# Patient Record
Sex: FEMALE | Race: WHITE | NOT HISPANIC OR LATINO | Employment: OTHER | URBAN - NONMETROPOLITAN AREA
[De-identification: names, ages, dates, MRNs, and addresses within clinical notes are randomized per-mention and may not be internally consistent; named-entity substitution may affect disease eponyms.]

---

## 2023-02-03 PROBLEM — R73.01 IFG (IMPAIRED FASTING GLUCOSE): Status: ACTIVE | Noted: 2023-02-03

## 2023-02-03 PROBLEM — R51.9 HEADACHE: Status: ACTIVE | Noted: 2023-02-03

## 2023-02-03 PROBLEM — E55.9 VITAMIN D DEFICIENCY, UNSPECIFIED: Status: ACTIVE | Noted: 2023-02-03

## 2023-02-03 PROBLEM — I10 HTN (HYPERTENSION): Status: ACTIVE | Noted: 2023-02-03

## 2023-02-03 PROBLEM — J45.909 ASTHMA (HHS-HCC): Status: ACTIVE | Noted: 2023-02-03

## 2023-02-03 PROBLEM — M85.80 OSTEOPENIA: Status: ACTIVE | Noted: 2023-02-03

## 2023-02-03 PROBLEM — J30.9 ALLERGIC RHINITIS: Status: ACTIVE | Noted: 2023-02-03

## 2023-02-03 PROBLEM — E66.3 OVERWEIGHT (BMI 25.0-29.9): Status: ACTIVE | Noted: 2023-02-03

## 2023-02-03 PROBLEM — M85.851 OSTEOPENIA OF FEMORAL NECK, BILATERAL: Status: ACTIVE | Noted: 2023-02-03

## 2023-02-03 PROBLEM — M85.852 OSTEOPENIA OF FEMORAL NECK, BILATERAL: Status: ACTIVE | Noted: 2023-02-03

## 2023-02-03 PROBLEM — N81.10 FEMALE CYSTOCELE: Status: ACTIVE | Noted: 2023-02-03

## 2023-02-03 RX ORDER — BISOPROLOL FUMARATE AND HYDROCHLOROTHIAZIDE 5; 6.25 MG/1; MG/1
1 TABLET ORAL DAILY
COMMUNITY
End: 2023-03-17 | Stop reason: SDUPTHER

## 2023-02-03 RX ORDER — SUMATRIPTAN 50 MG/1
50 TABLET, FILM COATED ORAL
COMMUNITY
End: 2023-03-17 | Stop reason: SDUPTHER

## 2023-02-03 RX ORDER — NAPROXEN SODIUM 220 MG
TABLET ORAL
COMMUNITY

## 2023-02-03 RX ORDER — LORATADINE 10 MG/1
TABLET ORAL
COMMUNITY

## 2023-03-17 ENCOUNTER — OFFICE VISIT (OUTPATIENT)
Dept: PRIMARY CARE | Facility: CLINIC | Age: 73
End: 2023-03-17
Payer: MEDICARE

## 2023-03-17 VITALS
BODY MASS INDEX: 29.02 KG/M2 | SYSTOLIC BLOOD PRESSURE: 118 MMHG | HEIGHT: 64 IN | HEART RATE: 68 BPM | WEIGHT: 170 LBS | DIASTOLIC BLOOD PRESSURE: 70 MMHG

## 2023-03-17 DIAGNOSIS — M85.89 OSTEOPENIA OF MULTIPLE SITES: Primary | ICD-10-CM

## 2023-03-17 DIAGNOSIS — G89.29 CHRONIC NONINTRACTABLE HEADACHE, UNSPECIFIED HEADACHE TYPE: ICD-10-CM

## 2023-03-17 DIAGNOSIS — R51.9 CHRONIC NONINTRACTABLE HEADACHE, UNSPECIFIED HEADACHE TYPE: ICD-10-CM

## 2023-03-17 DIAGNOSIS — E55.9 VITAMIN D DEFICIENCY, UNSPECIFIED: ICD-10-CM

## 2023-03-17 DIAGNOSIS — Z12.31 ENCOUNTER FOR SCREENING MAMMOGRAM FOR MALIGNANT NEOPLASM OF BREAST: ICD-10-CM

## 2023-03-17 DIAGNOSIS — E66.3 OVERWEIGHT (BMI 25.0-29.9): ICD-10-CM

## 2023-03-17 DIAGNOSIS — I10 PRIMARY HYPERTENSION: ICD-10-CM

## 2023-03-17 PROCEDURE — 99214 OFFICE O/P EST MOD 30 MIN: CPT | Performed by: NURSE PRACTITIONER

## 2023-03-17 PROCEDURE — 1036F TOBACCO NON-USER: CPT | Performed by: NURSE PRACTITIONER

## 2023-03-17 PROCEDURE — 3074F SYST BP LT 130 MM HG: CPT | Performed by: NURSE PRACTITIONER

## 2023-03-17 PROCEDURE — 1159F MED LIST DOCD IN RCRD: CPT | Performed by: NURSE PRACTITIONER

## 2023-03-17 PROCEDURE — 1160F RVW MEDS BY RX/DR IN RCRD: CPT | Performed by: NURSE PRACTITIONER

## 2023-03-17 PROCEDURE — 3078F DIAST BP <80 MM HG: CPT | Performed by: NURSE PRACTITIONER

## 2023-03-17 RX ORDER — SUMATRIPTAN 50 MG/1
50 TABLET, FILM COATED ORAL ONCE AS NEEDED
Qty: 27 TABLET | Refills: 3 | Status: SHIPPED | OUTPATIENT
Start: 2023-03-17 | End: 2023-04-19 | Stop reason: SDUPTHER

## 2023-03-17 RX ORDER — SUMATRIPTAN 50 MG/1
50 TABLET, FILM COATED ORAL ONCE AS NEEDED
Qty: 27 TABLET | Refills: 3 | Status: SHIPPED
Start: 2023-03-17 | End: 2023-03-17

## 2023-03-17 RX ORDER — BISOPROLOL FUMARATE AND HYDROCHLOROTHIAZIDE 5; 6.25 MG/1; MG/1
1 TABLET ORAL DAILY
Qty: 90 TABLET | Refills: 3 | Status: SHIPPED | OUTPATIENT
Start: 2023-03-17 | End: 2023-03-27 | Stop reason: SDUPTHER

## 2023-03-17 RX ORDER — BISOPROLOL FUMARATE AND HYDROCHLOROTHIAZIDE 5; 6.25 MG/1; MG/1
1 TABLET ORAL DAILY
Qty: 90 TABLET | Refills: 3 | Status: SHIPPED
Start: 2023-03-17 | End: 2023-03-17

## 2023-03-17 ASSESSMENT — ENCOUNTER SYMPTOMS
WHEEZING: 0
BLOOD IN STOOL: 0
ABDOMINAL PAIN: 0
HYPERTENSION: 1
SHORTNESS OF BREATH: 0
NAUSEA: 0
LIGHT-HEADEDNESS: 0
CONSTIPATION: 0
DIARRHEA: 0
CHILLS: 0
APPETITE CHANGE: 0
FEVER: 0
JOINT SWELLING: 0
MYALGIAS: 0
VOMITING: 0
COLOR CHANGE: 0
ARTHRALGIAS: 0
DIFFICULTY URINATING: 0
HEMATURIA: 0
ACTIVITY CHANGE: 0
HEADACHES: 1
DIZZINESS: 0
DYSURIA: 0

## 2023-03-17 NOTE — PROGRESS NOTES
"Subjective   Patient ID: Judy Mcgee is a 73 y.o. female who presents for No chief complaint on file..    HPI     Review of Systems    Objective   /70 (Patient Position: Sitting)   Pulse 68   Ht 1.626 m (5' 4\")   Wt 77.1 kg (170 lb)   BMI 29.18 kg/m²     Physical Exam    Assessment/Plan   {Assess/PlanSmartLinks:77213}       "

## 2023-03-17 NOTE — PROGRESS NOTES
Subjective   Patient ID: Judy Mcgee is a 73 y.o. female who presents for Annual Exam.    Well Adult Physical   Patient here for a comprehensive physical exam.The patient reports problems - urinary incontinence    Do you take any herbs or supplements that were not prescribed by a doctor? no   Are you taking calcium supplements? yes   Are you taking aspirin daily? no     History:  LMP: No LMP recorded.  Menopause at 13 years  Last pap date:   Abnormal pap? yes  : 2  Para: 2  Has had Cologaurd in , negative  No previous colonoscopy, declines colonoscopy at this time   Last mammogram ; normal        Hypertension  This is a chronic problem. The current episode started more than 1 year ago. The problem is unchanged. The problem is controlled. Associated symptoms include headaches (migarine average 3/month, summatriptan is effective relief). Pertinent negatives include no chest pain, peripheral edema or shortness of breath. There are no associated agents to hypertension. Risk factors for coronary artery disease include stress. Past treatments include beta blockers and diuretics. The current treatment provides mild improvement. There are no compliance problems.  There is no history of kidney disease or CVA. There is no history of chronic renal disease, sleep apnea or a thyroid problem.        Review of Systems   Constitutional:  Negative for activity change, appetite change, chills and fever.   HENT:  Negative for hearing loss.    Eyes:  Negative for visual disturbance.   Respiratory:  Negative for shortness of breath and wheezing.    Cardiovascular:  Negative for chest pain and leg swelling.   Gastrointestinal:  Negative for abdominal pain, blood in stool, constipation, diarrhea, nausea and vomiting.   Genitourinary:  Negative for difficulty urinating, dysuria and hematuria.        Complains of urinary incontinence when bladder is full and with coughing/sneezing   Musculoskeletal:  Negative for  "arthralgias, joint swelling and myalgias.   Skin: Negative.  Negative for color change.   Neurological:  Positive for headaches (migarine average 3/month, summatriptan is effective relief). Negative for dizziness and light-headedness.       Objective   /70 (Patient Position: Sitting)   Pulse 68   Ht 1.626 m (5' 4\")   Wt 77.1 kg (170 lb)   BMI 29.18 kg/m²     Physical Exam  Constitutional:       General: She is not in acute distress.     Appearance: Normal appearance.   HENT:      Head: Normocephalic.   Eyes:      Conjunctiva/sclera: Conjunctivae normal.      Pupils: Pupils are equal, round, and reactive to light.   Cardiovascular:      Rate and Rhythm: Normal rate and regular rhythm.      Pulses: Normal pulses.   Pulmonary:      Effort: Pulmonary effort is normal.      Breath sounds: Normal breath sounds.   Abdominal:      General: Bowel sounds are normal.      Palpations: Abdomen is soft.      Tenderness: There is no abdominal tenderness.   Musculoskeletal:         General: Normal range of motion.      Cervical back: Normal range of motion.   Skin:     General: Skin is warm and dry.   Neurological:      Mental Status: She is alert and oriented to person, place, and time.         Assessment/Plan   Diagnoses and all orders for this visit:  Osteopenia of multiple sites   -Dexa bone scan 2022   - Continue on calcium/vitamin D supplement     Chronic nonintractable headache, unspecified headache type  -     SUMAtriptan (Imitrex) 50 mg tablet; Take 1 tablet (50 mg) by mouth 1 time if needed for migraine for up to 1 dose. FOR MIGRAINE RELIEF. MAY REPEAT EVERY 2 HOURS. MAX 200MG/DAY.  -     Comprehensive Metabolic Panel; Future  -     CBC; Future  Primary hypertension  -     bisoproloL-hydrochlorothiazide (Ziac) 5-6.25 mg tablet; Take 1 tablet by mouth once daily.  -     Lipid Panel; Future  -     Comprehensive Metabolic Panel; Future  -     CBC; Future  Vitamin D deficiency, unspecified   - Continue on vitamin D " supplement    Overweight (BMI 25.0-29.9)   -Continue to follow heart healthy diet and exercise 3-5 weekly     Encounter for screening mammogram for malignant neoplasm of breast  -     BI mammo bilateral screening tomosynthesis; Future  Other orders  -     Follow Up In Primary Care; Future

## 2023-03-17 NOTE — PATIENT INSTRUCTIONS
Will send a bladder dairy form to my chart, please keep track of urine, episode of incontinence, and liquids taking in  Recommend following a heart healthy diet.  Follow up in 1 year for annual wellness

## 2023-03-27 ENCOUNTER — TELEPHONE (OUTPATIENT)
Dept: PRIMARY CARE | Facility: CLINIC | Age: 73
End: 2023-03-27
Payer: MEDICARE

## 2023-03-27 DIAGNOSIS — I10 PRIMARY HYPERTENSION: ICD-10-CM

## 2023-03-27 RX ORDER — BISOPROLOL FUMARATE AND HYDROCHLOROTHIAZIDE 5; 6.25 MG/1; MG/1
1 TABLET ORAL DAILY
Qty: 90 TABLET | Refills: 3 | Status: SHIPPED
Start: 2023-03-27 | End: 2023-03-29 | Stop reason: SDUPTHER

## 2023-03-29 DIAGNOSIS — I10 PRIMARY HYPERTENSION: ICD-10-CM

## 2023-03-30 DIAGNOSIS — I10 PRIMARY HYPERTENSION: ICD-10-CM

## 2023-03-30 RX ORDER — BISOPROLOL FUMARATE AND HYDROCHLOROTHIAZIDE 5; 6.25 MG/1; MG/1
1 TABLET ORAL DAILY
Qty: 30 TABLET | Refills: 0 | Status: SHIPPED | OUTPATIENT
Start: 2023-03-30 | End: 2023-03-30 | Stop reason: SDUPTHER

## 2023-03-31 RX ORDER — BISOPROLOL FUMARATE AND HYDROCHLOROTHIAZIDE 5; 6.25 MG/1; MG/1
1 TABLET ORAL DAILY
Qty: 90 TABLET | Refills: 3 | Status: SHIPPED | OUTPATIENT
Start: 2023-03-31 | End: 2024-03-18 | Stop reason: SDUPTHER

## 2023-04-19 DIAGNOSIS — R51.9 CHRONIC NONINTRACTABLE HEADACHE, UNSPECIFIED HEADACHE TYPE: ICD-10-CM

## 2023-04-19 DIAGNOSIS — G89.29 CHRONIC NONINTRACTABLE HEADACHE, UNSPECIFIED HEADACHE TYPE: ICD-10-CM

## 2023-04-19 RX ORDER — SUMATRIPTAN 50 MG/1
50 TABLET, FILM COATED ORAL ONCE AS NEEDED
Qty: 10 TABLET | Refills: 3 | Status: SHIPPED | OUTPATIENT
Start: 2023-04-19 | End: 2023-05-05 | Stop reason: SDUPTHER

## 2023-04-25 ENCOUNTER — LAB (OUTPATIENT)
Dept: LAB | Facility: LAB | Age: 73
End: 2023-04-25
Payer: MEDICARE

## 2023-04-25 DIAGNOSIS — G89.29 CHRONIC NONINTRACTABLE HEADACHE, UNSPECIFIED HEADACHE TYPE: ICD-10-CM

## 2023-04-25 DIAGNOSIS — I10 PRIMARY HYPERTENSION: ICD-10-CM

## 2023-04-25 DIAGNOSIS — R51.9 CHRONIC NONINTRACTABLE HEADACHE, UNSPECIFIED HEADACHE TYPE: ICD-10-CM

## 2023-04-25 LAB
ALANINE AMINOTRANSFERASE (SGPT) (U/L) IN SER/PLAS: 50 U/L (ref 7–45)
ALBUMIN (G/DL) IN SER/PLAS: 4.4 G/DL (ref 3.4–5)
ALKALINE PHOSPHATASE (U/L) IN SER/PLAS: 53 U/L (ref 33–136)
ANION GAP IN SER/PLAS: 11 MMOL/L (ref 10–20)
ASPARTATE AMINOTRANSFERASE (SGOT) (U/L) IN SER/PLAS: 30 U/L (ref 9–39)
BILIRUBIN TOTAL (MG/DL) IN SER/PLAS: 0.6 MG/DL (ref 0–1.2)
CALCIUM (MG/DL) IN SER/PLAS: 9.2 MG/DL (ref 8.6–10.3)
CARBON DIOXIDE, TOTAL (MMOL/L) IN SER/PLAS: 29 MMOL/L (ref 21–32)
CHLORIDE (MMOL/L) IN SER/PLAS: 104 MMOL/L (ref 98–107)
CHOLESTEROL (MG/DL) IN SER/PLAS: 170 MG/DL (ref 0–199)
CHOLESTEROL IN HDL (MG/DL) IN SER/PLAS: 47 MG/DL
CHOLESTEROL/HDL RATIO: 3.6
CREATININE (MG/DL) IN SER/PLAS: 0.76 MG/DL (ref 0.5–1.05)
ERYTHROCYTE DISTRIBUTION WIDTH (RATIO) BY AUTOMATED COUNT: 13.8 % (ref 11.5–14.5)
ERYTHROCYTE MEAN CORPUSCULAR HEMOGLOBIN CONCENTRATION (G/DL) BY AUTOMATED: 32 G/DL (ref 32–36)
ERYTHROCYTE MEAN CORPUSCULAR VOLUME (FL) BY AUTOMATED COUNT: 96 FL (ref 80–100)
ERYTHROCYTES (10*6/UL) IN BLOOD BY AUTOMATED COUNT: 4.54 X10E12/L (ref 4–5.2)
GFR FEMALE: 83 ML/MIN/1.73M2
GLUCOSE (MG/DL) IN SER/PLAS: 98 MG/DL (ref 74–99)
HEMATOCRIT (%) IN BLOOD BY AUTOMATED COUNT: 43.4 % (ref 36–46)
HEMOGLOBIN (G/DL) IN BLOOD: 13.9 G/DL (ref 12–16)
LDL: 102 MG/DL (ref 0–99)
LEUKOCYTES (10*3/UL) IN BLOOD BY AUTOMATED COUNT: 8.5 X10E9/L (ref 4.4–11.3)
PLATELETS (10*3/UL) IN BLOOD AUTOMATED COUNT: 384 X10E9/L (ref 150–450)
POTASSIUM (MMOL/L) IN SER/PLAS: 3.8 MMOL/L (ref 3.5–5.3)
PROTEIN TOTAL: 7.3 G/DL (ref 6.4–8.2)
SODIUM (MMOL/L) IN SER/PLAS: 140 MMOL/L (ref 136–145)
TRIGLYCERIDE (MG/DL) IN SER/PLAS: 107 MG/DL (ref 0–149)
UREA NITROGEN (MG/DL) IN SER/PLAS: 26 MG/DL (ref 6–23)
VLDL: 21 MG/DL (ref 0–40)

## 2023-04-25 PROCEDURE — 80053 COMPREHEN METABOLIC PANEL: CPT

## 2023-04-25 PROCEDURE — 85027 COMPLETE CBC AUTOMATED: CPT

## 2023-04-25 PROCEDURE — 36415 COLL VENOUS BLD VENIPUNCTURE: CPT

## 2023-04-25 PROCEDURE — 80061 LIPID PANEL: CPT

## 2023-05-05 DIAGNOSIS — G89.29 CHRONIC NONINTRACTABLE HEADACHE, UNSPECIFIED HEADACHE TYPE: ICD-10-CM

## 2023-05-05 DIAGNOSIS — R51.9 CHRONIC NONINTRACTABLE HEADACHE, UNSPECIFIED HEADACHE TYPE: ICD-10-CM

## 2023-05-06 RX ORDER — SUMATRIPTAN 50 MG/1
50 TABLET, FILM COATED ORAL ONCE AS NEEDED
Qty: 10 TABLET | Refills: 3 | Status: SHIPPED | OUTPATIENT
Start: 2023-05-06 | End: 2024-03-18 | Stop reason: SDUPTHER

## 2024-01-31 ENCOUNTER — OFFICE VISIT (OUTPATIENT)
Dept: PRIMARY CARE | Facility: CLINIC | Age: 74
End: 2024-01-31
Payer: MEDICARE

## 2024-01-31 VITALS
DIASTOLIC BLOOD PRESSURE: 80 MMHG | HEIGHT: 64 IN | TEMPERATURE: 97 F | SYSTOLIC BLOOD PRESSURE: 128 MMHG | WEIGHT: 169.2 LBS | OXYGEN SATURATION: 96 % | HEART RATE: 77 BPM | BODY MASS INDEX: 28.89 KG/M2

## 2024-01-31 DIAGNOSIS — B34.9 VIRAL SYNDROME: Primary | ICD-10-CM

## 2024-01-31 DIAGNOSIS — R05.1 ACUTE COUGH: ICD-10-CM

## 2024-01-31 PROCEDURE — 99213 OFFICE O/P EST LOW 20 MIN: CPT | Performed by: STUDENT IN AN ORGANIZED HEALTH CARE EDUCATION/TRAINING PROGRAM

## 2024-01-31 PROCEDURE — 3079F DIAST BP 80-89 MM HG: CPT | Performed by: STUDENT IN AN ORGANIZED HEALTH CARE EDUCATION/TRAINING PROGRAM

## 2024-01-31 PROCEDURE — 1159F MED LIST DOCD IN RCRD: CPT | Performed by: STUDENT IN AN ORGANIZED HEALTH CARE EDUCATION/TRAINING PROGRAM

## 2024-01-31 PROCEDURE — 1160F RVW MEDS BY RX/DR IN RCRD: CPT | Performed by: STUDENT IN AN ORGANIZED HEALTH CARE EDUCATION/TRAINING PROGRAM

## 2024-01-31 PROCEDURE — 87636 SARSCOV2 & INF A&B AMP PRB: CPT

## 2024-01-31 PROCEDURE — 3074F SYST BP LT 130 MM HG: CPT | Performed by: STUDENT IN AN ORGANIZED HEALTH CARE EDUCATION/TRAINING PROGRAM

## 2024-01-31 PROCEDURE — 1036F TOBACCO NON-USER: CPT | Performed by: STUDENT IN AN ORGANIZED HEALTH CARE EDUCATION/TRAINING PROGRAM

## 2024-01-31 RX ORDER — BENZONATATE 200 MG/1
200 CAPSULE ORAL 3 TIMES DAILY PRN
Qty: 42 CAPSULE | Refills: 0 | Status: SHIPPED | OUTPATIENT
Start: 2024-01-31 | End: 2024-03-01

## 2024-01-31 NOTE — PROGRESS NOTES
"Subjective   Patient ID: Jduy Mcgee is a 73 y.o. female who presents for Cough (Start Sunday - hasn't been around anyone sick) and Headache.    HPI    Cough since the last 4 days.   Worsened last night. Intermittent cough. Mostly dry cough. When she did bring up phlegm it was clear. Denies being SOB. No fever.   Pneumonia - no hx.   No hx of hospitalization with worsening SOB.   Has tried robitussin which did not help much.   Usually gets \"head cold\" this time of the year. But this feels different.    has been ill as well but mostly sinus symptoms. Has been to active.   Did not conduct home testing for covid.     Review of Systems  ROS negative except discussed above in HPI.    Vitals:    01/31/24 1053   BP: 128/80   Pulse: 77   Temp: 36.1 °C (97 °F)   SpO2: 96%     Objective   Physical Exam  Constitutional:       Appearance: Normal appearance.   Cardiovascular:      Rate and Rhythm: Normal rate and regular rhythm.   Pulmonary:      Effort: Pulmonary effort is normal.      Breath sounds: Normal breath sounds.   Musculoskeletal:      Cervical back: Normal range of motion and neck supple.   Lymphadenopathy:      Cervical: No cervical adenopathy.   Neurological:      Mental Status: She is alert.           Assessment/Plan   Judy was seen today for cough and headache.  Diagnoses and all orders for this visit:  Viral syndrome (Primary)  -     benzonatate (Tessalon) 200 mg capsule; Take 1 capsule (200 mg) by mouth 3 times a day as needed for cough. Do not crush or chew.  -     Sars-CoV-2 and Influenza A/B PCR; Future  Acute cough  -     benzonatate (Tessalon) 200 mg capsule; Take 1 capsule (200 mg) by mouth 3 times a day as needed for cough. Do not crush or chew.  -     Sars-CoV-2 and Influenza A/B PCR; Future      Follow up as needed.         Adrian Amaro MD MPH  "

## 2024-02-01 LAB
FLUAV RNA RESP QL NAA+PROBE: NOT DETECTED
FLUBV RNA RESP QL NAA+PROBE: NOT DETECTED
SARS-COV-2 RNA RESP QL NAA+PROBE: NOT DETECTED

## 2024-03-18 ENCOUNTER — OFFICE VISIT (OUTPATIENT)
Dept: PRIMARY CARE | Facility: CLINIC | Age: 74
End: 2024-03-18
Payer: MEDICARE

## 2024-03-18 ENCOUNTER — LAB (OUTPATIENT)
Dept: LAB | Facility: LAB | Age: 74
End: 2024-03-18
Payer: MEDICARE

## 2024-03-18 VITALS
BODY MASS INDEX: 29.3 KG/M2 | DIASTOLIC BLOOD PRESSURE: 74 MMHG | TEMPERATURE: 96 F | HEART RATE: 60 BPM | OXYGEN SATURATION: 98 % | WEIGHT: 170.7 LBS | SYSTOLIC BLOOD PRESSURE: 130 MMHG

## 2024-03-18 DIAGNOSIS — Z12.11 COLON CANCER SCREENING: ICD-10-CM

## 2024-03-18 DIAGNOSIS — Z00.00 ROUTINE GENERAL MEDICAL EXAMINATION AT HEALTH CARE FACILITY: Primary | ICD-10-CM

## 2024-03-18 DIAGNOSIS — I10 PRIMARY HYPERTENSION: ICD-10-CM

## 2024-03-18 DIAGNOSIS — Z12.31 ENCOUNTER FOR SCREENING MAMMOGRAM FOR BREAST CANCER: ICD-10-CM

## 2024-03-18 DIAGNOSIS — Z78.0 MENOPAUSE: ICD-10-CM

## 2024-03-18 DIAGNOSIS — R51.9 CHRONIC NONINTRACTABLE HEADACHE, UNSPECIFIED HEADACHE TYPE: ICD-10-CM

## 2024-03-18 DIAGNOSIS — G43.009 MIGRAINE WITHOUT AURA AND WITHOUT STATUS MIGRAINOSUS, NOT INTRACTABLE: ICD-10-CM

## 2024-03-18 DIAGNOSIS — G89.29 CHRONIC NONINTRACTABLE HEADACHE, UNSPECIFIED HEADACHE TYPE: ICD-10-CM

## 2024-03-18 LAB
ANION GAP SERPL CALC-SCNC: 10 MMOL/L (ref 10–20)
BUN SERPL-MCNC: 22 MG/DL (ref 6–23)
CALCIUM SERPL-MCNC: 10 MG/DL (ref 8.6–10.3)
CHLORIDE SERPL-SCNC: 104 MMOL/L (ref 98–107)
CHOLEST SERPL-MCNC: 168 MG/DL (ref 0–199)
CHOLESTEROL/HDL RATIO: 3
CO2 SERPL-SCNC: 31 MMOL/L (ref 21–32)
CREAT SERPL-MCNC: 0.61 MG/DL (ref 0.5–1.05)
EGFRCR SERPLBLD CKD-EPI 2021: >90 ML/MIN/1.73M*2
ERYTHROCYTE [DISTWIDTH] IN BLOOD BY AUTOMATED COUNT: 13.7 % (ref 11.5–14.5)
GLUCOSE SERPL-MCNC: 94 MG/DL (ref 74–99)
HCT VFR BLD AUTO: 43.5 % (ref 36–46)
HDLC SERPL-MCNC: 56 MG/DL
HGB BLD-MCNC: 14.2 G/DL (ref 12–16)
LDLC SERPL CALC-MCNC: 90 MG/DL
MCH RBC QN AUTO: 31 PG (ref 26–34)
MCHC RBC AUTO-ENTMCNC: 32.6 G/DL (ref 32–36)
MCV RBC AUTO: 95 FL (ref 80–100)
NON HDL CHOLESTEROL: 112 MG/DL (ref 0–149)
NRBC BLD-RTO: 0 /100 WBCS (ref 0–0)
PLATELET # BLD AUTO: 343 X10*3/UL (ref 150–450)
POTASSIUM SERPL-SCNC: 4 MMOL/L (ref 3.5–5.3)
RBC # BLD AUTO: 4.58 X10*6/UL (ref 4–5.2)
SODIUM SERPL-SCNC: 141 MMOL/L (ref 136–145)
TRIGL SERPL-MCNC: 111 MG/DL (ref 0–149)
VLDL: 22 MG/DL (ref 0–40)
WBC # BLD AUTO: 8.1 X10*3/UL (ref 4.4–11.3)

## 2024-03-18 PROCEDURE — 1036F TOBACCO NON-USER: CPT | Performed by: FAMILY MEDICINE

## 2024-03-18 PROCEDURE — 80048 BASIC METABOLIC PNL TOTAL CA: CPT

## 2024-03-18 PROCEDURE — 36415 COLL VENOUS BLD VENIPUNCTURE: CPT

## 2024-03-18 PROCEDURE — 3078F DIAST BP <80 MM HG: CPT | Performed by: FAMILY MEDICINE

## 2024-03-18 PROCEDURE — G0439 PPPS, SUBSEQ VISIT: HCPCS | Performed by: FAMILY MEDICINE

## 2024-03-18 PROCEDURE — 1124F ACP DISCUSS-NO DSCNMKR DOCD: CPT | Performed by: FAMILY MEDICINE

## 2024-03-18 PROCEDURE — 1170F FXNL STATUS ASSESSED: CPT | Performed by: FAMILY MEDICINE

## 2024-03-18 PROCEDURE — 3075F SYST BP GE 130 - 139MM HG: CPT | Performed by: FAMILY MEDICINE

## 2024-03-18 PROCEDURE — 1160F RVW MEDS BY RX/DR IN RCRD: CPT | Performed by: FAMILY MEDICINE

## 2024-03-18 PROCEDURE — 85027 COMPLETE CBC AUTOMATED: CPT

## 2024-03-18 PROCEDURE — 80061 LIPID PANEL: CPT

## 2024-03-18 PROCEDURE — 1159F MED LIST DOCD IN RCRD: CPT | Performed by: FAMILY MEDICINE

## 2024-03-18 PROCEDURE — 99214 OFFICE O/P EST MOD 30 MIN: CPT | Performed by: FAMILY MEDICINE

## 2024-03-18 RX ORDER — SUMATRIPTAN 50 MG/1
50 TABLET, FILM COATED ORAL ONCE AS NEEDED
Qty: 9 TABLET | Refills: 3 | Status: SHIPPED | OUTPATIENT
Start: 2024-03-18

## 2024-03-18 RX ORDER — BISOPROLOL FUMARATE AND HYDROCHLOROTHIAZIDE 5; 6.25 MG/1; MG/1
1 TABLET ORAL DAILY
Qty: 90 TABLET | Refills: 3 | Status: SHIPPED | OUTPATIENT
Start: 2024-03-18 | End: 2025-03-18

## 2024-03-18 ASSESSMENT — ACTIVITIES OF DAILY LIVING (ADL)
BATHING: INDEPENDENT
DOING_HOUSEWORK: INDEPENDENT
DRESSING: INDEPENDENT
MANAGING_FINANCES: INDEPENDENT
GROCERY_SHOPPING: INDEPENDENT
TAKING_MEDICATION: INDEPENDENT

## 2024-03-18 ASSESSMENT — ENCOUNTER SYMPTOMS
LOSS OF SENSATION IN FEET: 0
OCCASIONAL FEELINGS OF UNSTEADINESS: 0
DEPRESSION: 0

## 2024-03-18 ASSESSMENT — PATIENT HEALTH QUESTIONNAIRE - PHQ9
1. LITTLE INTEREST OR PLEASURE IN DOING THINGS: NOT AT ALL
SUM OF ALL RESPONSES TO PHQ9 QUESTIONS 1 AND 2: 0
2. FEELING DOWN, DEPRESSED OR HOPELESS: NOT AT ALL

## 2024-03-18 NOTE — PROGRESS NOTES
Subjective   Reason for Visit: Judy Mcgee is an 74 y.o. female here for a Medicare Wellness visit.     Past Medical, Surgical, and Family History reviewed and updated in chart.    Reviewed all medications by prescribing practitioner or clinical pharmacist (such as prescriptions, OTCs, herbal therapies and supplements) and documented in the medical record.    HPI  Colon cancer screening: cologuard 4/2020-negative    Neg family history for colon cancer   Last Mammogram: 2023 ordered today     DEXA Scan: 9/22-osteopenia, Discussed weight bearing exercises, Incorporation of Calcium and Vitamin D via diet/supplements  Prevnar Vaccine: 3/17  Pneumovax Vaccine: 9/18  Shingrix Vaccine: Completed 7/19    Never treated for osteoporosis     HTN    No cp palpitations    Good control    Migraine with out aura    2 sumatriptan per month    High LDL      Will get labs today after 2 oz of OJ     Advance directives:. Advance Directives discussed, verbal or written information provided if indicated. Patient has living will. Patient has healthcare POA.     Patient's End of Life Decisions: End of life decisions were reviewed with the patient. I agree to follow the patient's decisions. Concerns with the patient's end of life decisions:   Full Code     Patient Care Team:  Jose Arboleda MD as PCP - General (Family Medicine)     Review of Systems    Objective   Vitals:  /74 (BP Location: Left arm, Patient Position: Sitting)   Pulse 60   Temp 35.6 °C (96 °F)   Wt 77.4 kg (170 lb 11.2 oz)   SpO2 98%   BMI 29.30 kg/m²       Physical Exam  Vitals reviewed.   Constitutional:       Appearance: Normal appearance.   HENT:      Head: Normocephalic and atraumatic.   Eyes:      Conjunctiva/sclera: Conjunctivae normal.   Cardiovascular:      Rate and Rhythm: Normal rate and regular rhythm.   Pulmonary:      Effort: Pulmonary effort is normal.      Breath sounds: Normal breath sounds.   Abdominal:      General: Abdomen is flat. Bowel  sounds are normal.   Musculoskeletal:      Cervical back: Neck supple.   Skin:     General: Skin is warm and dry.   Neurological:      General: No focal deficit present.      Mental Status: She is alert and oriented to person, place, and time.   Psychiatric:         Mood and Affect: Mood normal.         Behavior: Behavior normal.         Thought Content: Thought content normal.         Judgment: Judgment normal.         Assessment/Plan   Problem List Items Addressed This Visit       Headache    Relevant Medications    SUMAtriptan (Imitrex) 50 mg tablet    HTN (hypertension)    Relevant Medications    bisoproloL-hydrochlorothiazide (Ziac) 5-6.25 mg tablet    Other Relevant Orders    Lipid Panel    Basic Metabolic Panel    CBC     Other Visit Diagnoses       Routine general medical examination at health care facility    -  Primary    Menopause        Relevant Orders    XR DEXA bone density    Colon cancer screening        Relevant Orders    Cologuard® colon cancer screening    Encounter for screening mammogram for breast cancer        Relevant Orders    BI mammo bilateral screening tomosynthesis    Migraine without aura and without status migrainosus, not intractable        Relevant Medications    SUMAtriptan (Imitrex) 50 mg tablet

## 2024-04-05 LAB — NONINV COLON CA DNA+OCC BLD SCRN STL QL: POSITIVE

## 2024-04-08 DIAGNOSIS — R19.5 POSITIVE COLORECTAL CANCER SCREENING USING COLOGUARD TEST: Primary | ICD-10-CM

## 2024-04-12 ENCOUNTER — OFFICE VISIT (OUTPATIENT)
Dept: SURGERY | Facility: CLINIC | Age: 74
End: 2024-04-12
Payer: MEDICARE

## 2024-04-12 ENCOUNTER — DOCUMENTATION (OUTPATIENT)
Dept: SURGERY | Facility: CLINIC | Age: 74
End: 2024-04-12

## 2024-04-12 VITALS
WEIGHT: 170 LBS | DIASTOLIC BLOOD PRESSURE: 80 MMHG | HEART RATE: 66 BPM | BODY MASS INDEX: 30.12 KG/M2 | HEIGHT: 63 IN | SYSTOLIC BLOOD PRESSURE: 140 MMHG

## 2024-04-12 DIAGNOSIS — R19.5 POSITIVE COLORECTAL CANCER SCREENING USING COLOGUARD TEST: ICD-10-CM

## 2024-04-12 PROCEDURE — 1036F TOBACCO NON-USER: CPT | Performed by: SURGERY

## 2024-04-12 PROCEDURE — 99203 OFFICE O/P NEW LOW 30 MIN: CPT | Performed by: SURGERY

## 2024-04-12 PROCEDURE — 1159F MED LIST DOCD IN RCRD: CPT | Performed by: SURGERY

## 2024-04-12 PROCEDURE — 1160F RVW MEDS BY RX/DR IN RCRD: CPT | Performed by: SURGERY

## 2024-04-12 PROCEDURE — 3079F DIAST BP 80-89 MM HG: CPT | Performed by: SURGERY

## 2024-04-12 PROCEDURE — 3077F SYST BP >= 140 MM HG: CPT | Performed by: SURGERY

## 2024-04-12 NOTE — PROGRESS NOTES
Notified patient of Colonoscopy  scheduled on 4-25-24   .Instructions reviewed. Advised patient [facility] will contact them 24 hours before surgery with arrival time. Pt voices understanding. Lee Ann Valles MA.

## 2024-04-12 NOTE — PROGRESS NOTES
General Surgery Consultation    Patient: Judy Mcgee  : 1950  MRN: [id number]  Date of Consultation: 24    Primary Care Provider: Jose Arboleda MD  Referring Provider: Jose Arboleda MD    Chief Complaint: Positive Cologuard    History of Present Illness: Judy Mcgee is a 74 y.o. old female seen at the request of Jose Arboleda MD for evaluation of positive Cologuard.  Patient has never had a colonoscopy.  She denies any change in bowel habits unexplained abdominal pain weight loss or blood in the stool.  She states her maternal grandmother had a tumor in her bowel in her 70s but lived to be in her 90s.  Patient is scheduled for mammograms next week.    Medical History:  HTN  Osteoporosis      Surgical History:  Carpal tunnel  Knee replacement  Ostectomy calcaneal spur/Achilles      Home Medications:  Prior to Admission medications    Medication Sig Start Date End Date Taking? Authorizing Provider   bisoproloL-hydrochlorothiazide (Ziac) 5-6.25 mg tablet Take 1 tablet by mouth once daily. 3/18/24 3/18/25 Yes Jose Arboleda MD   CALCIUM CITRATE-VITAMIN D3 ORAL Take by mouth.   Yes Historical Provider, MD   loratadine (Claritin) 10 mg tablet Take by mouth.   Yes Historical Provider, MD   mv,inés,min/iron/folic acid/lut (COMPLETE MULTI ORAL) Take by mouth. Multi Complete Oral Capsule   Yes Historical Provider, MD   naproxen sodium (Aleve) 220 mg tablet Take by mouth.   Yes Historical Provider, MD   SUMAtriptan (Imitrex) 50 mg tablet Take 1 tablet (50 mg) by mouth 1 time if needed for migraine (may repeat dose in 1 hour if needed, DO NOT Eceed 200 mg/day) for up to 10 doses. FOR MIGRAINE RELIEF. MAY REPEAT EVERY 2 HOURS. MAX 200MG/DAY. 3/18/24  Yes Jose Arboleda MD       Allergies:  No Known Allergies  has No Known Allergies.    Family History:   Family History   Problem Relation Name Age of Onset    Osteoporosis Mother      Hypertension Father      Heart attack Father      Other (cardiac  "disorder) Father      Stroke Father      Hypertension Sister      Migraines Sister      Colon cancer Maternal Grandmother      Stroke Other grandparent        Social History:  Patient denies tobacco use and drinks occasional        ROS:  Constitutional:  no fever, sweats, and chills  Cardiovascular: No chest pain  Respiratory: No cough or shortness of breath  Gastrointestinal: Denies any change in bowel habits or blood in her stool  Genitourinary: no dysuria  Musculoskeletal: no weakness or swelling  Integumentary: no rashes  Neurological: no confusion  Endocrine: no heat or cold intolerance  Heme/Lymph: no easy bruising or bleeding    Objective:  /80   Pulse 66   Ht 1.61 m (5' 3.39\")   Wt 77.1 kg (170 lb)   BMI 29.75 kg/m²     Physical Exam:  Constitutional: No acute distress, conversant, pleasant  Neurologic: alert and oriented  Psych: appropriate affect  Ears, Nose, Mouth and Throat: mucus membranes moist  Pulmonary: No labored breathing  Cardiovascular: Regular rate and rhythm  Abdomen: soft, non-distended, non-tender  Musculoskeletal: Moves all extremities, no edema  Skin: warm and dry    Procedure:      Labs:    Imaging:      Assessment and Plan: Judy Mcgee is a 74 y.o. old female with positive Cologuard.  We discussed the indication for colonoscopy as well as the differential diagnosis including anything from no findings all the way up to a colon cancer.  We reviewed the prep as well as the risks and potential complications including but not limited to bleeding, infection, reaction to the anesthetic, stroke MI and/or death.  We discussed the risk of perforation or need for completion barium studies.  All questions were answered and she asked us to proceed..     Chantelle Staples MD  4/12/2024  "

## 2024-04-12 NOTE — H&P (VIEW-ONLY)
General Surgery Consultation    Patient: Judy Mcgee  : 1950  MRN: [id number]  Date of Consultation: 24    Primary Care Provider: Jose Arboleda MD  Referring Provider: Jose Arboleda MD    Chief Complaint: Positive Cologuard    History of Present Illness: Judy Mcgee is a 74 y.o. old female seen at the request of Jose Arboleda MD for evaluation of positive Cologuard.  Patient has never had a colonoscopy.  She denies any change in bowel habits unexplained abdominal pain weight loss or blood in the stool.  She states her maternal grandmother had a tumor in her bowel in her 70s but lived to be in her 90s.  Patient is scheduled for mammograms next week.    Medical History:  HTN  Osteoporosis      Surgical History:  Carpal tunnel  Knee replacement  Ostectomy calcaneal spur/Achilles      Home Medications:  Prior to Admission medications    Medication Sig Start Date End Date Taking? Authorizing Provider   bisoproloL-hydrochlorothiazide (Ziac) 5-6.25 mg tablet Take 1 tablet by mouth once daily. 3/18/24 3/18/25 Yes Jose Arboleda MD   CALCIUM CITRATE-VITAMIN D3 ORAL Take by mouth.   Yes Historical Provider, MD   loratadine (Claritin) 10 mg tablet Take by mouth.   Yes Historical Provider, MD   mv,inés,min/iron/folic acid/lut (COMPLETE MULTI ORAL) Take by mouth. Multi Complete Oral Capsule   Yes Historical Provider, MD   naproxen sodium (Aleve) 220 mg tablet Take by mouth.   Yes Historical Provider, MD   SUMAtriptan (Imitrex) 50 mg tablet Take 1 tablet (50 mg) by mouth 1 time if needed for migraine (may repeat dose in 1 hour if needed, DO NOT Eceed 200 mg/day) for up to 10 doses. FOR MIGRAINE RELIEF. MAY REPEAT EVERY 2 HOURS. MAX 200MG/DAY. 3/18/24  Yes Jose Arboleda MD       Allergies:  No Known Allergies  has No Known Allergies.    Family History:   Family History   Problem Relation Name Age of Onset    Osteoporosis Mother      Hypertension Father      Heart attack Father      Other (cardiac  "disorder) Father      Stroke Father      Hypertension Sister      Migraines Sister      Colon cancer Maternal Grandmother      Stroke Other grandparent        Social History:  Patient denies tobacco use and drinks occasional        ROS:  Constitutional:  no fever, sweats, and chills  Cardiovascular: No chest pain  Respiratory: No cough or shortness of breath  Gastrointestinal: Denies any change in bowel habits or blood in her stool  Genitourinary: no dysuria  Musculoskeletal: no weakness or swelling  Integumentary: no rashes  Neurological: no confusion  Endocrine: no heat or cold intolerance  Heme/Lymph: no easy bruising or bleeding    Objective:  /80   Pulse 66   Ht 1.61 m (5' 3.39\")   Wt 77.1 kg (170 lb)   BMI 29.75 kg/m²     Physical Exam:  Constitutional: No acute distress, conversant, pleasant  Neurologic: alert and oriented  Psych: appropriate affect  Ears, Nose, Mouth and Throat: mucus membranes moist  Pulmonary: No labored breathing  Cardiovascular: Regular rate and rhythm  Abdomen: soft, non-distended, non-tender  Musculoskeletal: Moves all extremities, no edema  Skin: warm and dry    Procedure:      Labs:    Imaging:      Assessment and Plan: Judy Mcgee is a 74 y.o. old female with positive Cologuard.  We discussed the indication for colonoscopy as well as the differential diagnosis including anything from no findings all the way up to a colon cancer.  We reviewed the prep as well as the risks and potential complications including but not limited to bleeding, infection, reaction to the anesthetic, stroke MI and/or death.  We discussed the risk of perforation or need for completion barium studies.  All questions were answered and she asked us to proceed..     Chantelle Staples MD  4/12/2024  "

## 2024-04-17 ENCOUNTER — HOSPITAL ENCOUNTER (OUTPATIENT)
Dept: RADIOLOGY | Facility: CLINIC | Age: 74
End: 2024-04-17
Payer: MEDICARE

## 2024-04-17 ENCOUNTER — APPOINTMENT (OUTPATIENT)
Dept: RADIOLOGY | Facility: CLINIC | Age: 74
End: 2024-04-17
Payer: MEDICARE

## 2024-04-22 ENCOUNTER — HOSPITAL ENCOUNTER (OUTPATIENT)
Dept: RADIOLOGY | Facility: CLINIC | Age: 74
Discharge: HOME | End: 2024-04-22
Payer: MEDICARE

## 2024-04-22 VITALS — BODY MASS INDEX: 30.12 KG/M2 | HEIGHT: 63 IN | WEIGHT: 170 LBS

## 2024-04-22 DIAGNOSIS — Z78.0 MENOPAUSE: ICD-10-CM

## 2024-04-22 DIAGNOSIS — Z12.31 ENCOUNTER FOR SCREENING MAMMOGRAM FOR BREAST CANCER: ICD-10-CM

## 2024-04-22 PROCEDURE — 77063 BREAST TOMOSYNTHESIS BI: CPT | Performed by: RADIOLOGY

## 2024-04-22 PROCEDURE — 77080 DXA BONE DENSITY AXIAL: CPT | Mod: MUE

## 2024-04-22 PROCEDURE — 77067 SCR MAMMO BI INCL CAD: CPT | Performed by: RADIOLOGY

## 2024-04-22 PROCEDURE — 77067 SCR MAMMO BI INCL CAD: CPT | Mod: MUE

## 2024-04-22 PROCEDURE — 77063 BREAST TOMOSYNTHESIS BI: CPT

## 2024-04-22 PROCEDURE — 77080 DXA BONE DENSITY AXIAL: CPT

## 2024-04-23 PROCEDURE — 77063 BREAST TOMOSYNTHESIS BI: CPT

## 2024-04-24 ENCOUNTER — APPOINTMENT (OUTPATIENT)
Dept: RADIOLOGY | Facility: CLINIC | Age: 74
End: 2024-04-24
Payer: MEDICARE

## 2024-04-25 ENCOUNTER — HOSPITAL ENCOUNTER (OUTPATIENT)
Dept: OPERATING ROOM | Facility: HOSPITAL | Age: 74
Setting detail: OUTPATIENT SURGERY
Discharge: HOME | End: 2024-04-25
Payer: MEDICARE

## 2024-04-25 ENCOUNTER — ANESTHESIA (OUTPATIENT)
Dept: OPERATING ROOM | Facility: HOSPITAL | Age: 74
End: 2024-04-25
Payer: MEDICARE

## 2024-04-25 ENCOUNTER — ANESTHESIA EVENT (OUTPATIENT)
Dept: OPERATING ROOM | Facility: HOSPITAL | Age: 74
End: 2024-04-25
Payer: MEDICARE

## 2024-04-25 VITALS
HEIGHT: 63 IN | SYSTOLIC BLOOD PRESSURE: 106 MMHG | RESPIRATION RATE: 16 BRPM | WEIGHT: 164.68 LBS | DIASTOLIC BLOOD PRESSURE: 48 MMHG | TEMPERATURE: 97.8 F | BODY MASS INDEX: 29.18 KG/M2 | OXYGEN SATURATION: 98 % | HEART RATE: 60 BPM

## 2024-04-25 DIAGNOSIS — R19.5 POSITIVE COLORECTAL CANCER SCREENING USING COLOGUARD TEST: Primary | ICD-10-CM

## 2024-04-25 PROCEDURE — 2500000004 HC RX 250 GENERAL PHARMACY W/ HCPCS (ALT 636 FOR OP/ED): Performed by: ANESTHESIOLOGY

## 2024-04-25 PROCEDURE — 3700000001 HC GENERAL ANESTHESIA TIME - INITIAL BASE CHARGE: Performed by: ANESTHESIOLOGY

## 2024-04-25 PROCEDURE — 3600000002 HC OR TIME - INITIAL BASE CHARGE - PROCEDURE LEVEL TWO: Performed by: ANESTHESIOLOGY

## 2024-04-25 PROCEDURE — 7100000009 HC PHASE TWO TIME - INITIAL BASE CHARGE: Performed by: ANESTHESIOLOGY

## 2024-04-25 PROCEDURE — 3600000007 HC OR TIME - EACH INCREMENTAL 1 MINUTE - PROCEDURE LEVEL TWO: Performed by: ANESTHESIOLOGY

## 2024-04-25 PROCEDURE — 7100000010 HC PHASE TWO TIME - EACH INCREMENTAL 1 MINUTE: Performed by: ANESTHESIOLOGY

## 2024-04-25 PROCEDURE — 3700000002 HC GENERAL ANESTHESIA TIME - EACH INCREMENTAL 1 MINUTE: Performed by: ANESTHESIOLOGY

## 2024-04-25 PROCEDURE — 2500000005 HC RX 250 GENERAL PHARMACY W/O HCPCS: Performed by: ANESTHESIOLOGY

## 2024-04-25 PROCEDURE — 2500000004 HC RX 250 GENERAL PHARMACY W/ HCPCS (ALT 636 FOR OP/ED): Mod: JZ | Performed by: SURGERY

## 2024-04-25 PROCEDURE — 45378 DIAGNOSTIC COLONOSCOPY: CPT | Performed by: SURGERY

## 2024-04-25 RX ORDER — LABETALOL HYDROCHLORIDE 5 MG/ML
5 INJECTION, SOLUTION INTRAVENOUS ONCE AS NEEDED
Status: DISCONTINUED | OUTPATIENT
Start: 2024-04-25 | End: 2024-04-26 | Stop reason: HOSPADM

## 2024-04-25 RX ORDER — PROPOFOL 10 MG/ML
INJECTION, EMULSION INTRAVENOUS CONTINUOUS PRN
Status: DISCONTINUED | OUTPATIENT
Start: 2024-04-25 | End: 2024-04-25

## 2024-04-25 RX ORDER — ONDANSETRON HYDROCHLORIDE 2 MG/ML
4 INJECTION, SOLUTION INTRAVENOUS ONCE AS NEEDED
Status: DISCONTINUED | OUTPATIENT
Start: 2024-04-25 | End: 2024-04-26 | Stop reason: HOSPADM

## 2024-04-25 RX ORDER — SODIUM CHLORIDE 9 MG/ML
100 INJECTION, SOLUTION INTRAVENOUS CONTINUOUS
Status: DISCONTINUED | OUTPATIENT
Start: 2024-04-25 | End: 2024-04-26 | Stop reason: HOSPADM

## 2024-04-25 RX ORDER — MORPHINE SULFATE 4 MG/ML
2 INJECTION, SOLUTION INTRAMUSCULAR; INTRAVENOUS EVERY 5 MIN PRN
Status: DISCONTINUED | OUTPATIENT
Start: 2024-04-25 | End: 2024-04-26 | Stop reason: HOSPADM

## 2024-04-25 RX ORDER — PROPOFOL 10 MG/ML
INJECTION, EMULSION INTRAVENOUS AS NEEDED
Status: DISCONTINUED | OUTPATIENT
Start: 2024-04-25 | End: 2024-04-25

## 2024-04-25 RX ORDER — SODIUM CHLORIDE, SODIUM LACTATE, POTASSIUM CHLORIDE, CALCIUM CHLORIDE 600; 310; 30; 20 MG/100ML; MG/100ML; MG/100ML; MG/100ML
50 INJECTION, SOLUTION INTRAVENOUS CONTINUOUS
Status: DISCONTINUED | OUTPATIENT
Start: 2024-04-25 | End: 2024-04-26 | Stop reason: HOSPADM

## 2024-04-25 RX ORDER — OXYCODONE HYDROCHLORIDE 5 MG/1
5 TABLET ORAL EVERY 4 HOURS PRN
Status: DISCONTINUED | OUTPATIENT
Start: 2024-04-25 | End: 2024-04-26 | Stop reason: HOSPADM

## 2024-04-25 RX ORDER — SODIUM CHLORIDE, SODIUM LACTATE, POTASSIUM CHLORIDE, CALCIUM CHLORIDE 600; 310; 30; 20 MG/100ML; MG/100ML; MG/100ML; MG/100ML
100 INJECTION, SOLUTION INTRAVENOUS CONTINUOUS
Status: DISCONTINUED | OUTPATIENT
Start: 2024-04-25 | End: 2024-04-26 | Stop reason: HOSPADM

## 2024-04-25 RX ORDER — MORPHINE SULFATE 4 MG/ML
4 INJECTION, SOLUTION INTRAMUSCULAR; INTRAVENOUS EVERY 5 MIN PRN
Status: DISCONTINUED | OUTPATIENT
Start: 2024-04-25 | End: 2024-04-26 | Stop reason: HOSPADM

## 2024-04-25 RX ADMIN — GLUCAGON HYDROCHLORIDE 1 MG: KIT at 10:29

## 2024-04-25 RX ADMIN — Medication 25 MG: at 10:27

## 2024-04-25 RX ADMIN — SODIUM CHLORIDE, POTASSIUM CHLORIDE, SODIUM LACTATE AND CALCIUM CHLORIDE 100 ML/HR: 600; 310; 30; 20 INJECTION, SOLUTION INTRAVENOUS at 09:25

## 2024-04-25 RX ADMIN — PROPOFOL 80 MCG/KG/MIN: 10 INJECTION, EMULSION INTRAVENOUS at 10:27

## 2024-04-25 ASSESSMENT — COLUMBIA-SUICIDE SEVERITY RATING SCALE - C-SSRS
2. HAVE YOU ACTUALLY HAD ANY THOUGHTS OF KILLING YOURSELF?: NO
1. IN THE PAST MONTH, HAVE YOU WISHED YOU WERE DEAD OR WISHED YOU COULD GO TO SLEEP AND NOT WAKE UP?: NO
6. HAVE YOU EVER DONE ANYTHING, STARTED TO DO ANYTHING, OR PREPARED TO DO ANYTHING TO END YOUR LIFE?: NO

## 2024-04-25 ASSESSMENT — PAIN SCALES - GENERAL
PAINLEVEL_OUTOF10: 0 - NO PAIN

## 2024-04-25 ASSESSMENT — PAIN - FUNCTIONAL ASSESSMENT
PAIN_FUNCTIONAL_ASSESSMENT: 0-10

## 2024-04-25 NOTE — ANESTHESIA POSTPROCEDURE EVALUATION
Patient: Judy Mcgee    Procedure Summary       Date: 04/25/24 Room / Location: [facility] OR    Anesthesia Start: 1024 Anesthesia Stop: 1050    Procedure: COLONOSCOPY Diagnosis: Positive colorectal cancer screening using Cologuard test    Scheduled Providers: Chantelle Staples MD; Chu Perez MD Responsible Provider: Chu Perez MD    Anesthesia Type: MAC ASA Status: 2            Anesthesia Type: MAC    Vitals Value Taken Time   BP 91/45 04/25/24 1048   Temp 36.6 °C (97.8 °F) 04/25/24 1048   Pulse 63 04/25/24 1048   Resp 16 04/25/24 1048   SpO2 96 % 04/25/24 1048       Anesthesia Post Evaluation    Patient location during evaluation: bedside  Patient participation: complete - patient participated  Level of consciousness: sleepy but conscious  Pain management: adequate  Airway patency: patent  Cardiovascular status: acceptable  Respiratory status: acceptable  Hydration status: acceptable  Postoperative Nausea and Vomiting: none    No notable events documented.

## 2024-04-25 NOTE — ANESTHESIA PREPROCEDURE EVALUATION
Patient: Judy Mcgee    Procedure Information       Date/Time: 04/25/24 1020    Scheduled providers: Chantelle Staples MD; Chu Perez MD    Procedure: COLONOSCOPY    Location: [facility] OR            Relevant Problems   Cardiac   (+) HTN (hypertension)      Pulmonary   (+) Asthma ([facility]HS-HCC)       Clinical information reviewed:   Tobacco  Allergies  Meds   Med Hx  Surg Hx  OB Status  Fam Hx          NPO Detail:  NPO/Void Status  Carbohydrate Drink Given Prior to Surgery? : N  Date of Last Liquid: 04/25/24  Time of Last Liquid: 0500  Date of Last Solid: 04/24/24  Time of Last Solid: 0800  Last Intake Type: Clear fluids  Time of Last Void: 0900         Physical Exam    Airway  Mallampati: II  TM distance: >3 FB  Neck ROM: full     Cardiovascular   Rhythm: regular     Dental    Pulmonary    Abdominal            Anesthesia Plan    History of general anesthesia?: yes  History of complications of general anesthesia?: no    ASA 2     MAC     Anesthetic plan and risks discussed with patient.

## 2024-04-26 ENCOUNTER — TELEPHONE (OUTPATIENT)
Dept: SURGERY | Facility: CLINIC | Age: 74
End: 2024-04-26
Payer: MEDICARE

## 2024-04-26 NOTE — TELEPHONE ENCOUNTER
Left message to see how patient was doing after colonscopy. Patient to return call if has any questions or concerns.

## 2024-09-04 ENCOUNTER — APPOINTMENT (OUTPATIENT)
Dept: PRIMARY CARE | Facility: CLINIC | Age: 74
End: 2024-09-04
Payer: MEDICARE

## 2024-09-04 ENCOUNTER — LAB (OUTPATIENT)
Dept: LAB | Facility: LAB | Age: 74
End: 2024-09-04
Payer: MEDICARE

## 2024-09-04 VITALS
SYSTOLIC BLOOD PRESSURE: 130 MMHG | DIASTOLIC BLOOD PRESSURE: 80 MMHG | BODY MASS INDEX: 29.29 KG/M2 | WEIGHT: 165.3 LBS | HEART RATE: 56 BPM | OXYGEN SATURATION: 96 %

## 2024-09-04 DIAGNOSIS — H26.9 CATARACT OF BOTH EYES, UNSPECIFIED CATARACT TYPE: ICD-10-CM

## 2024-09-04 DIAGNOSIS — I10 PRIMARY HYPERTENSION: ICD-10-CM

## 2024-09-04 DIAGNOSIS — Z01.818 PREOP EXAMINATION: Primary | ICD-10-CM

## 2024-09-04 LAB
ANION GAP SERPL CALC-SCNC: 13 MMOL/L (ref 10–20)
BUN SERPL-MCNC: 21 MG/DL (ref 6–23)
CALCIUM SERPL-MCNC: 9.6 MG/DL (ref 8.6–10.3)
CHLORIDE SERPL-SCNC: 102 MMOL/L (ref 98–107)
CO2 SERPL-SCNC: 28 MMOL/L (ref 21–32)
CREAT SERPL-MCNC: 0.73 MG/DL (ref 0.5–1.05)
EGFRCR SERPLBLD CKD-EPI 2021: 87 ML/MIN/1.73M*2
GLUCOSE SERPL-MCNC: 109 MG/DL (ref 74–99)
POTASSIUM SERPL-SCNC: 4.3 MMOL/L (ref 3.5–5.3)
SODIUM SERPL-SCNC: 139 MMOL/L (ref 136–145)

## 2024-09-04 PROCEDURE — 36415 COLL VENOUS BLD VENIPUNCTURE: CPT

## 2024-09-04 PROCEDURE — 3079F DIAST BP 80-89 MM HG: CPT | Performed by: FAMILY MEDICINE

## 2024-09-04 PROCEDURE — 93000 ELECTROCARDIOGRAM COMPLETE: CPT | Performed by: FAMILY MEDICINE

## 2024-09-04 PROCEDURE — 1036F TOBACCO NON-USER: CPT | Performed by: FAMILY MEDICINE

## 2024-09-04 PROCEDURE — 3075F SYST BP GE 130 - 139MM HG: CPT | Performed by: FAMILY MEDICINE

## 2024-09-04 PROCEDURE — 99214 OFFICE O/P EST MOD 30 MIN: CPT | Performed by: FAMILY MEDICINE

## 2024-09-04 PROCEDURE — 1159F MED LIST DOCD IN RCRD: CPT | Performed by: FAMILY MEDICINE

## 2024-09-04 PROCEDURE — 80048 BASIC METABOLIC PNL TOTAL CA: CPT

## 2024-09-04 PROCEDURE — 1160F RVW MEDS BY RX/DR IN RCRD: CPT | Performed by: FAMILY MEDICINE

## 2024-09-04 NOTE — PROGRESS NOTES
Subjective   Patient ID: Judy Mcgee is a 74 y.o. female who presents for Pre-op Exam (9/26/24 Dr Lee; cataracs).    HPI   Cataract removal per Dr Lee     Revised cardiac risk index is =0   Exercise walking  30 mintues daily      With no associated chest pain   H/o bmp  normal 6 months ago     Migraine last needed imitrex 2 to 3 months   Last aleve was last weekly     Htn good control     Tkr 2019 last EKG           Review of Systems    Objective   /80   Pulse 56   Wt 75 kg (165 lb 4.8 oz)   SpO2 96%   BMI 29.29 kg/m²     Physical Exam  Vitals reviewed.   Constitutional:       Appearance: Normal appearance.   HENT:      Head: Normocephalic and atraumatic.   Eyes:      Conjunctiva/sclera: Conjunctivae normal.   Cardiovascular:      Rate and Rhythm: Normal rate and regular rhythm.   Pulmonary:      Effort: Pulmonary effort is normal.      Breath sounds: Normal breath sounds.   Musculoskeletal:      Cervical back: Neck supple.      Right lower leg: No edema.      Left lower leg: No edema.   Skin:     General: Skin is warm and dry.   Neurological:      General: No focal deficit present.      Mental Status: She is alert and oriented to person, place, and time.   Psychiatric:         Mood and Affect: Mood normal.         Behavior: Behavior normal.         Thought Content: Thought content normal.         Judgment: Judgment normal.         Assessment/Plan   Diagnoses and all orders for this visit:  Preop examination  -     ECG 12 lead (Clinic Performed)  Cataract of both eyes, unspecified cataract type  Primary hypertension  -     Basic Metabolic Panel; Future    Pt is low risk for sugery      EKG no changes from 2018

## 2024-09-19 ENCOUNTER — PREP FOR PROCEDURE (OUTPATIENT)
Dept: OPERATING ROOM | Facility: HOSPITAL | Age: 74
End: 2024-09-19
Payer: MEDICARE

## 2024-09-19 DIAGNOSIS — H25.811 COMBINED FORMS OF AGE-RELATED CATARACT OF RIGHT EYE: Primary | ICD-10-CM

## 2024-09-19 RX ORDER — PREDNISOLONE ACETATE 10 MG/ML
1 SUSPENSION/ DROPS OPHTHALMIC ONCE
OUTPATIENT
Start: 2024-09-19 | End: 2024-09-19

## 2024-09-19 RX ORDER — POVIDONE-IODINE 5 %
SOLUTION, NON-ORAL OPHTHALMIC (EYE) ONCE
OUTPATIENT
Start: 2024-09-19 | End: 2024-09-19

## 2024-09-19 RX ORDER — KETOROLAC TROMETHAMINE 5 MG/ML
1 SOLUTION OPHTHALMIC
OUTPATIENT
Start: 2024-09-19 | End: 2024-09-19

## 2024-09-19 RX ORDER — SODIUM CHLORIDE, SODIUM LACTATE, POTASSIUM CHLORIDE, CALCIUM CHLORIDE 600; 310; 30; 20 MG/100ML; MG/100ML; MG/100ML; MG/100ML
20 INJECTION, SOLUTION INTRAVENOUS CONTINUOUS
OUTPATIENT
Start: 2024-09-19

## 2024-09-19 RX ORDER — TETRACAINE HYDROCHLORIDE 5 MG/ML
1 SOLUTION OPHTHALMIC ONCE
OUTPATIENT
Start: 2024-09-19 | End: 2024-09-19

## 2024-11-19 ENCOUNTER — OFFICE VISIT (OUTPATIENT)
Dept: PRIMARY CARE | Facility: CLINIC | Age: 74
End: 2024-11-19
Payer: MEDICARE

## 2024-11-19 VITALS
SYSTOLIC BLOOD PRESSURE: 136 MMHG | WEIGHT: 165.8 LBS | TEMPERATURE: 97.7 F | OXYGEN SATURATION: 97 % | HEART RATE: 65 BPM | DIASTOLIC BLOOD PRESSURE: 70 MMHG | BODY MASS INDEX: 29.38 KG/M2

## 2024-11-19 DIAGNOSIS — J40 BRONCHITIS: ICD-10-CM

## 2024-11-19 DIAGNOSIS — R09.81 SINUS CONGESTION: Primary | ICD-10-CM

## 2024-11-19 PROBLEM — Z86.69 HISTORY OF RETINAL TEAR: Status: RESOLVED | Noted: 2024-09-11 | Resolved: 2024-11-19

## 2024-11-19 PROBLEM — H47.233 OPTIC CUPPING OF BOTH EYES: Status: ACTIVE | Noted: 2024-09-11

## 2024-11-19 PROBLEM — H25.812 COMBINED FORMS OF AGE-RELATED CATARACT OF LEFT EYE: Status: ACTIVE | Noted: 2024-09-11

## 2024-11-19 PROBLEM — G43.909 MIGRAINES: Status: ACTIVE | Noted: 2024-11-19

## 2024-11-19 PROBLEM — H40.053 OCULAR HYPERTENSION, BILATERAL: Status: ACTIVE | Noted: 2024-09-11

## 2024-11-19 PROCEDURE — 1160F RVW MEDS BY RX/DR IN RCRD: CPT | Performed by: NURSE PRACTITIONER

## 2024-11-19 PROCEDURE — 3075F SYST BP GE 130 - 139MM HG: CPT | Performed by: NURSE PRACTITIONER

## 2024-11-19 PROCEDURE — 3078F DIAST BP <80 MM HG: CPT | Performed by: NURSE PRACTITIONER

## 2024-11-19 PROCEDURE — 1036F TOBACCO NON-USER: CPT | Performed by: NURSE PRACTITIONER

## 2024-11-19 PROCEDURE — 99213 OFFICE O/P EST LOW 20 MIN: CPT | Performed by: NURSE PRACTITIONER

## 2024-11-19 PROCEDURE — 1159F MED LIST DOCD IN RCRD: CPT | Performed by: NURSE PRACTITIONER

## 2024-11-19 RX ORDER — TIMOLOL MALEATE 5 MG/ML
1 SOLUTION/ DROPS OPHTHALMIC DAILY
COMMUNITY
Start: 2024-11-01

## 2024-11-19 RX ORDER — AZITHROMYCIN 250 MG/1
TABLET, FILM COATED ORAL
Qty: 6 TABLET | Refills: 0 | Status: SHIPPED | OUTPATIENT
Start: 2024-11-19 | End: 2024-11-24

## 2024-11-19 ASSESSMENT — PATIENT HEALTH QUESTIONNAIRE - PHQ9
1. LITTLE INTEREST OR PLEASURE IN DOING THINGS: NOT AT ALL
2. FEELING DOWN, DEPRESSED OR HOPELESS: NOT AT ALL
SUM OF ALL RESPONSES TO PHQ9 QUESTIONS 1 AND 2: 0

## 2024-11-19 ASSESSMENT — ENCOUNTER SYMPTOMS
FEVER: 0
SHORTNESS OF BREATH: 0
CONSTITUTIONAL NEGATIVE: 1
CARDIOVASCULAR NEGATIVE: 1
COUGH: 1
GASTROINTESTINAL NEGATIVE: 1
PSYCHIATRIC NEGATIVE: 1
SINUS PRESSURE: 1
WHEEZING: 0

## 2024-11-19 NOTE — PROGRESS NOTES
Subjective   Patient ID: Judy Mcgee is a 74 y.o. female who presents for Sick Visit (Sneezing, runny nose, sinus pressure, cough).     Here with cold/sinus symptoms x 5 days.  Nasal congestion clear/cloudy discharge,  sinus drainage, sinus pressyre  Cough mainly in the mornings  ST one day only, no problems now.  No fever   was sick about 1 month ago  Was exposed by grandson about 10 - 14 days ago.           Review of Systems   Constitutional: Negative.  Negative for fever.   HENT:  Positive for congestion, postnasal drip and sinus pressure.    Respiratory:  Positive for cough. Negative for shortness of breath and wheezing.    Cardiovascular: Negative.    Gastrointestinal: Negative.    Skin: Negative.    Psychiatric/Behavioral: Negative.         Objective   /70   Pulse 65   Temp 36.5 °C (97.7 °F)   Wt 75.2 kg (165 lb 12.8 oz)   SpO2 97%   BMI 29.38 kg/m²     Physical Exam  Constitutional:       Appearance: Normal appearance.   HENT:      Head: Normocephalic.      Right Ear: Tympanic membrane normal.      Left Ear: Tympanic membrane normal.      Nose:      Right Sinus: Maxillary sinus tenderness and frontal sinus tenderness present.      Left Sinus: Maxillary sinus tenderness and frontal sinus tenderness present.      Comments: Thick mucusy discharge posterior left nares     Mouth/Throat:      Pharynx: Oropharynx is clear.   Eyes:      Conjunctiva/sclera: Conjunctivae normal.   Cardiovascular:      Rate and Rhythm: Normal rate and regular rhythm.      Heart sounds: Normal heart sounds.   Pulmonary:      Effort: Pulmonary effort is normal.      Breath sounds: Normal breath sounds.   Musculoskeletal:      Cervical back: Neck supple.   Skin:     General: Skin is warm and dry.   Neurological:      Mental Status: She is alert.   Psychiatric:         Mood and Affect: Mood normal.         Assessment/Plan   Diagnoses and all orders for this visit:  Sinus congestion  Bronchitis  -     azithromycin  (Zithromax) 250 mg tablet; Take 2 tablets (500 mg) by mouth once daily for 1 day, THEN 1 tablet (250 mg) once daily for 4 days. Take 2 tabs (500 mg) by mouth today, than 1 daily for 4 days..     Possible early sinusitis/bronchtis.   with similar symptoms and cleared with Zpak  Will prescribe antibiotic. OK to wait 1-2 days to see if clears on it's own.   Follow up symptoms worsen/change/concerns

## 2024-11-21 ENCOUNTER — PREP FOR PROCEDURE (OUTPATIENT)
Dept: OPERATING ROOM | Facility: HOSPITAL | Age: 74
End: 2024-11-21
Payer: MEDICARE

## 2024-11-21 DIAGNOSIS — H25.812 COMBINED FORMS OF AGE-RELATED CATARACT OF LEFT EYE: Primary | ICD-10-CM

## 2024-11-21 DIAGNOSIS — H40.1122 PRIMARY OPEN ANGLE GLAUCOMA OF LEFT EYE, MODERATE STAGE: ICD-10-CM

## 2024-11-21 RX ORDER — KETOROLAC TROMETHAMINE 5 MG/ML
1 SOLUTION OPHTHALMIC
OUTPATIENT
Start: 2024-11-21 | End: 2024-11-21

## 2024-11-21 RX ORDER — TETRACAINE HYDROCHLORIDE 5 MG/ML
1 SOLUTION OPHTHALMIC ONCE
OUTPATIENT
Start: 2024-11-21 | End: 2024-11-21

## 2024-11-21 RX ORDER — FLUOROMETHOLONE 1 MG/ML
1 SUSPENSION/ DROPS OPHTHALMIC ONCE
OUTPATIENT
Start: 2024-11-21 | End: 2024-11-21

## 2024-11-21 RX ORDER — POVIDONE-IODINE 5 %
SOLUTION, NON-ORAL OPHTHALMIC (EYE) ONCE
OUTPATIENT
Start: 2024-11-21 | End: 2024-11-21

## 2024-12-03 NOTE — PREPROCEDURE INSTRUCTIONS
No outpatient medications have been marked as taking for the 12/5/24 encounter (Hospital Encounter).                       NPO Instructions:    Do not eat any food after midnight the night before your surgery/procedure.  You may have clear liquids until TWO hours before surgery/procedure. This includes water, black tea/coffee, (no milk or cream) apple juice and electrolyte drinks (Gatorade).    Additional Instructions:     Will need  home, will receive call day before surgery with arrival time

## 2024-12-04 ENCOUNTER — ANESTHESIA EVENT (OUTPATIENT)
Dept: OPERATING ROOM | Facility: HOSPITAL | Age: 74
End: 2024-12-04
Payer: MEDICARE

## 2024-12-05 ENCOUNTER — ANESTHESIA (OUTPATIENT)
Dept: OPERATING ROOM | Facility: HOSPITAL | Age: 74
End: 2024-12-05
Payer: MEDICARE

## 2024-12-05 ENCOUNTER — HOSPITAL ENCOUNTER (OUTPATIENT)
Facility: HOSPITAL | Age: 74
Setting detail: OUTPATIENT SURGERY
Discharge: HOME | End: 2024-12-05
Attending: OPHTHALMOLOGY | Admitting: OPHTHALMOLOGY
Payer: MEDICARE

## 2024-12-05 VITALS
TEMPERATURE: 97 F | RESPIRATION RATE: 14 BRPM | HEART RATE: 58 BPM | OXYGEN SATURATION: 95 % | DIASTOLIC BLOOD PRESSURE: 59 MMHG | WEIGHT: 164.68 LBS | SYSTOLIC BLOOD PRESSURE: 110 MMHG | BODY MASS INDEX: 29.18 KG/M2 | HEIGHT: 63 IN

## 2024-12-05 PROCEDURE — 2500000004 HC RX 250 GENERAL PHARMACY W/ HCPCS (ALT 636 FOR OP/ED): Performed by: ANESTHESIOLOGY

## 2024-12-05 PROCEDURE — C1889 IMPLANT/INSERT DEVICE, NOC: HCPCS | Performed by: OPHTHALMOLOGY

## 2024-12-05 PROCEDURE — 2500000005 HC RX 250 GENERAL PHARMACY W/O HCPCS: Performed by: OPHTHALMOLOGY

## 2024-12-05 PROCEDURE — 7100000009 HC PHASE TWO TIME - INITIAL BASE CHARGE: Performed by: OPHTHALMOLOGY

## 2024-12-05 PROCEDURE — 7100000010 HC PHASE TWO TIME - EACH INCREMENTAL 1 MINUTE: Performed by: OPHTHALMOLOGY

## 2024-12-05 PROCEDURE — 2500000001 HC RX 250 WO HCPCS SELF ADMINISTERED DRUGS (ALT 637 FOR MEDICARE OP): Performed by: OPHTHALMOLOGY

## 2024-12-05 PROCEDURE — 2720000007 HC OR 272 NO HCPCS: Performed by: OPHTHALMOLOGY

## 2024-12-05 PROCEDURE — 3600000008 HC OR TIME - EACH INCREMENTAL 1 MINUTE - PROCEDURE LEVEL THREE: Performed by: OPHTHALMOLOGY

## 2024-12-05 PROCEDURE — 3700000002 HC GENERAL ANESTHESIA TIME - EACH INCREMENTAL 1 MINUTE: Performed by: OPHTHALMOLOGY

## 2024-12-05 PROCEDURE — 2500000004 HC RX 250 GENERAL PHARMACY W/ HCPCS (ALT 636 FOR OP/ED): Performed by: OPHTHALMOLOGY

## 2024-12-05 PROCEDURE — V2632 POST CHMBR INTRAOCULAR LENS: HCPCS | Performed by: OPHTHALMOLOGY

## 2024-12-05 PROCEDURE — 3700000001 HC GENERAL ANESTHESIA TIME - INITIAL BASE CHARGE: Performed by: OPHTHALMOLOGY

## 2024-12-05 PROCEDURE — 3600000003 HC OR TIME - INITIAL BASE CHARGE - PROCEDURE LEVEL THREE: Performed by: OPHTHALMOLOGY

## 2024-12-05 PROCEDURE — 2760000001 HC OR 276 NO HCPCS: Performed by: OPHTHALMOLOGY

## 2024-12-05 DEVICE — STERILE UV ABSORBING HYDROPHOBIC ACRYLIC FOLDABLE ASPHERIC POSTERIOR CHAMBER INTRAOCULAR LENS WITH THE AUTONOME™ AUTOMATED PRE-LOADED DELIVERY SYSTEM
Type: IMPLANTABLE DEVICE | Site: EYE | Status: FUNCTIONAL
Brand: CLAREON™

## 2024-12-05 RX ORDER — PILOCARPINE HYDROCHLORIDE 10 MG/ML
SOLUTION/ DROPS OPHTHALMIC AS NEEDED
Status: DISCONTINUED | OUTPATIENT
Start: 2024-12-05 | End: 2024-12-05 | Stop reason: HOSPADM

## 2024-12-05 RX ORDER — MIDAZOLAM HYDROCHLORIDE 1 MG/ML
1 INJECTION INTRAMUSCULAR; INTRAVENOUS ONCE
Status: COMPLETED | OUTPATIENT
Start: 2024-12-05 | End: 2024-12-05

## 2024-12-05 RX ORDER — ONDANSETRON HYDROCHLORIDE 2 MG/ML
4 INJECTION, SOLUTION INTRAVENOUS ONCE
Status: COMPLETED | OUTPATIENT
Start: 2024-12-05 | End: 2024-12-05

## 2024-12-05 RX ORDER — LIDOCAINE HYDROCHLORIDE 10 MG/ML
INJECTION, SOLUTION EPIDURAL; INFILTRATION; INTRACAUDAL; PERINEURAL AS NEEDED
Status: DISCONTINUED | OUTPATIENT
Start: 2024-12-05 | End: 2024-12-05

## 2024-12-05 RX ORDER — DEXAMETHASONE SODIUM PHOSPHATE 10 MG/ML
5 INJECTION INTRAMUSCULAR; INTRAVENOUS ONCE
Status: COMPLETED | OUTPATIENT
Start: 2024-12-05 | End: 2024-12-05

## 2024-12-05 RX ORDER — KETOROLAC TROMETHAMINE 5 MG/ML
1 SOLUTION OPHTHALMIC
Status: COMPLETED | OUTPATIENT
Start: 2024-12-05 | End: 2024-12-05

## 2024-12-05 RX ORDER — LIDOCAINE HYDROCHLORIDE AND EPINEPHRINE 10; 10 UG/ML; MG/ML
INJECTION, SOLUTION INFILTRATION; PERINEURAL AS NEEDED
Status: DISCONTINUED | OUTPATIENT
Start: 2024-12-05 | End: 2024-12-05 | Stop reason: HOSPADM

## 2024-12-05 RX ORDER — MIDAZOLAM HYDROCHLORIDE 1 MG/ML
INJECTION INTRAMUSCULAR; INTRAVENOUS AS NEEDED
Status: DISCONTINUED | OUTPATIENT
Start: 2024-12-05 | End: 2024-12-05

## 2024-12-05 RX ORDER — PROPOFOL 10 MG/ML
INJECTION, EMULSION INTRAVENOUS AS NEEDED
Status: DISCONTINUED | OUTPATIENT
Start: 2024-12-05 | End: 2024-12-05

## 2024-12-05 RX ORDER — POVIDONE-IODINE 5 %
SOLUTION, NON-ORAL OPHTHALMIC (EYE) ONCE
Status: COMPLETED | OUTPATIENT
Start: 2024-12-05 | End: 2024-12-05

## 2024-12-05 RX ORDER — TETRACAINE HYDROCHLORIDE 5 MG/ML
1 SOLUTION OPHTHALMIC ONCE
Status: COMPLETED | OUTPATIENT
Start: 2024-12-05 | End: 2024-12-05

## 2024-12-05 SDOH — HEALTH STABILITY: MENTAL HEALTH: CURRENT SMOKER: 0

## 2024-12-05 ASSESSMENT — PAIN - FUNCTIONAL ASSESSMENT
PAIN_FUNCTIONAL_ASSESSMENT: 0-10

## 2024-12-05 ASSESSMENT — PAIN SCALES - GENERAL
PAINLEVEL_OUTOF10: 0 - NO PAIN
PAINLEVEL_OUTOF10: 0 - NO PAIN
PAIN_LEVEL: 0
PAINLEVEL_OUTOF10: 0 - NO PAIN

## 2024-12-05 NOTE — NURSING NOTE
Pt discharged to car in stable condition. Pt has personal belongings and discharge instructions. Care released to pt .

## 2024-12-05 NOTE — H&P
H&P Notes  - documented in this encounter   Mj Lee MD - 11/26/2024 2:07 PM EST  Formatting of this note is different from the original.  HISTORY AND PHYSICAL EXAMINATION    SERVICE DATE: 11/26/2024  SERVICE TIME: 2:07 PM    PRIMARY CARE PHYSICIAN: Jose Arboleda MD    REASON FOR VISIT:  Judy Mcgee is a 74 year old female who is being seen for Combined form age related cataract left eye / Primary open angle glaucoma left eye    The patient has the following:  ACTIVE PROBLEM LIST  Migraines  Combined Forms of Age-Related Cataract of Right Eye  Combined Forms of Age-Related Cataract of Left Eye  Ocular Hypertension, Bilateral  Optic Cupping of Both Eyes  History of Retinal Tear  Essential Hypertension    SUBJECTIVE  CHIEF COMPLAINT: Combined form age related cataract left eye, Primary open angle glaucoma left eye  Associated symptoms: Blurry vision, difficulty reading small print, glare around lights at night    PAST MEDICAL HISTORY  Diagnosis Date  Essential hypertension  Migraines    PAST SURGICAL HISTORY  Procedure Laterality Date  REMOVAL OF HEEL SPUR Right  TOTAL KNEE REPLACEMENT Left  2019    FAMILY HISTORY  Problem Relation Age of Onset  Glaucoma Mother    SOCIAL HISTORY:  Social History    Tobacco Use  Smoking status: Never  Smokeless tobacco: Never    MEDICATIONS:  Prior to Admission medications as of 11/26/24 1357  Medication Sig Last Dose Taking  timolol maleate (TIMOPTIC) 0.5 % ophthalmic solution Use 1 Drop in both eyes every morning. Taking Yes  latanoprost (XALATAN) 0.005 % ophthalmic solution Use 1 Drop in both eyes daily at bedtime. Taking Yes  loratadine (CLARITIN) 10 mg tablet Take by mouth as directed. Taking Yes  bisoprolol-hydrochlorothiazide (ZIAC) 2.5-6.25 mg per tablet Take 1 tablet by mouth once daily. Taking Yes  SUMATRIPTAN SUCCINATE (IMITREX ORAL) Take by mouth. Taking Yes  diclofenac sodium (VOLTAREN) 1 % topical gel Apply 2 g to affected area three times daily as  needed.    No medication comments found.    CURRENT ALLERGIES:  ALLERGIES  Allergen Reactions  Cortisone Rash    REVIEW OF SYSTEMS:  PAIN ASSESSMENT:  General: No weight loss, malaise or fevers.  Neuro: No Hx of stroke or seizures  Respiratory: No history of current cough or dyspnea, or pneumonia in the past 6 weeks. No history of respiratory/pulmonary symptoms or problems  Cardiovascular: Positive for: Hypertension  GI: No history of GI symptoms or problems. No history of esophageal varices, recent ascites, or ETOH greater than 2 drinks per day.  : No history of UTI in past 6 weeks. No history of renal failure. Not currently on or requiring dialysis. No history of  symptoms or problems.  GYN: Negative for abnormal vaginal bleeding, abnormal vaginal discharge.  Pregnancy: Denies  Endocrine: No history of diabetes. Has not taken steroids within the past 30 days. No history of endocrinological symptoms or problems.  Hematology: No history of bleeding or clotting disorder. Pt is not taking anti-coagulation or platelet medications. No history of hematological symptoms or problems.  Oncology: No history of CA metastasis, chemo within 30 days, or radiotherapy within 90 days. Has not lost 10% of body wt in 6 months. No history of oncological symptoms or problems.  Psych: No history of psychiatric symptoms or problems.  Musculoskeletal: Negative for joint pain or swelling, back pain or muscle pain.  Skin: Negative for lesions, rash and itching.    PHYSICAL EXAM:  VITALS:  /70  Pulse 65        General: Alert and oriented  Skin: Normal color, no rash, no lesions.  HEENT: EOM, pupils equal, round and reactive.  Cardiovascular: Normal S1 & S2, no rubs, murmurs or gallops. No JVD. Pulse regular.  Lungs: Normal breath sounds, no wheezes or crackles.  Abdomen: Soft, non-tender, no rigidity.  Extremities: No deformity, no edema or tenderness, no joint swelling or clubbing.  Neurological: Normal cognition and motor  skills.  Pulses: Carotid and radial pulses normal +2.    Diagnostic tests reviewed for today's visit:  No new labs or tests    ASSESSMENT  Medication and Non-Pharmacologic VTE Prophylaxis/Anticoagulants      VTE Prophylaxis: VTE prophylaxis appropriate    Impression: There is no known pertinent medical condition which may affect silvia-operative course      [unfilled]  Clinical Risk Factors for Possible Cardiac Complications:  None    Patient is scheduled for a low-risk procedure.    FUNCTIONAL STATUS: Walk indoors, such as around the house (1.75 METs)  Do light work around the house, such as dusting or washing dishes (2.70 METs)  Take care of self, that is eating, dressing, bathing, using the toilet (2.75 METs)    Functional Class (NYHA): N/A    [employer]: Not obtained    PLAN  CONSULTS:  Patient does not require consults for optimization at this time.    The Following Tests/Procedures Have Been Initiated:  None    Instructions Given to Patient:  Patient given verbal and written preop instructions and voices comprehension and compliance.    SIGNATURE: Mj Lee MD PATIENT NAME: Judy Mcgee  DATE: November 26, 2024 MRN: [id number]  TIME: 2:07 PM PAGER/CONTACT #:    Electronically signed by Mj Lee MD at 11/26/2024 2:09 PM EST   Source Comments  - [facility]   In the event this information is protected by the Federal Confidentiality of Alcohol and Drug Abuse Patient Records regulations: This information has been disclosed to you from records protected by Federal confidentiality rules (42 CFR Part 2). The Federal rules prohibit you from making any further disclosure of this information unless further disclosure is expressly permitted by the written consent of the person to whom it pertains or as otherwise permitted by 42 CFR Part 2. A general authorization for the release of medical or other information is NOT sufficient for this purpose. The Federal rules restrict any use of the information  to criminally investigate or prosecute any alcohol or drug abuse patient.  Reason for Visit    Reason for Visit -  Reason Comments   Blurred Vision Both Eyes     Difficulty Reading Both Eyes     Glare Both eyes     Encounter Details    Encounter Details  Date Type Department Care Team (Latest Contact Info) Description   11/26/2024 1:30 PM EST Office Visit [facility] Ophthalmology   [address] [id number]   [phone]  Mj Lee MD   [address] [id number]   [phone] (Work)   [phone] (Fax)  Combined forms of age-related cataract of left eye (Primary Dx);  Primary open-angle glaucoma, left eye, moderate stage;  Primary open-angle glaucoma, right eye, moderate stage;  Combined forms of age-related cataract of right eye;  Optic cupping of both eyes;  History of retinal tear;  Essential hypertension;  Hx of migraines     Social History  - documented as of this encounter   Social History  Tobacco Use Types Packs/Day Years Used Date   Smoking Tobacco: Never           Smokeless Tobacco: Never             Social History  Area Deprivation Index Answer Date Recorded   National Score (1-100), lower number is lower risk 91 11/26/2024   State Score (1-10), lower number is lower risk 9 11/26/2024   Data from: https://www.neighborhoodatlas.[facility].edu/. Last address used for calculation [address] [place] [address] 11/26/2024     Social History  Sex and Gender Information Value Date Recorded   Sex Assigned at Birth Not on file     Gender Identity Not on file     Sexual Orientation Not on file       Social History  Travel History Travel Start Travel End   [place] 11/10/2024 11/15/2024     Last Filed Vital Signs  - documented in this encounter   Last Filed Vital Signs  Vital Sign Reading Time Taken Comments   Blood Pressure 136/70 11/26/2024 1:54 PM EST     Pulse 65 11/26/2024 1:54 PM EST     Temperature - -     Respiratory Rate - -     Oxygen Saturation - -     Inhaled Oxygen Concentration - -      Weight - -     Height - -     Body Mass Index - -       Patient Instructions  - documented in this encounter   Patient Instructions  Mj Lee MD - 11/26/2024 2:07 PM EST   Formatting of this note is different from the original.  Continue Systane Complete Artificial Tears - Use 1 Drop into both eyes three times a day.    Current Ophthalmic Meds        timolol maleate (TIMOPTIC) 0.5 % ophthalmic solution Use 1 Drop in both eyes every morning.  latanoprost (XALATAN) 0.005 % ophthalmic solution Use 1 Drop in both eyes daily at bedtime.        The nature of glaucoma was discussed, with emphasis on the non-reversible damage to the optic nerve. Treatment options and the importance of regular examinations and testing were covered in detail, as well as the consequences of non-compliance. The patient was given the opportunity to ask questions.    If you have any questions please contact our office at [phone].  After office hours or on the weekend, please call Dr. Lee on his cell phone at [phone].    Electronically signed by Mj Lee MD at 11/26/2024 2:07 PM EST     Progress Notes  - documented in this encounter   jM Lee MD - 11/26/2024 1:57 PM EST  Formatting of this note is different from the original.  ASSESSMENT/PLAN:  1. Combined forms of age-related cataract of left eye - ICD9: 366.19, ICD10: H25.812 (primary diagnosis)    Upon eye examination, patient was found to have a visually significant cataract both eyes. Discussed cataract surgery with patient and different intraocular lens implant options with patient: basic monofocal intraocular lens implant, Toric intraocular lens implant, and presbyopia correction intraocular lens implant. In my medical opinion, based on medical history and ocular examination, cataract surgery with intraocular lens implant will correct patient's vision and improve quality of patient's daily living activities. Patient wishes to have traditional  cataract surgery with basic intraocular lens left eye scheduled on 12/5/24. Patient wishes to have cataract surgery with the option stated above. Patient understands that an intraocular lens implant does not necessarily replace the need for glasses. Patient understands that it is impossible for the surgeon to inform him/her of every possible complication that may occur. The surgeon has answered all of the patient's questions. Patient understands that if he/she has a mature or dense cataract, pseudoexfoliation cataract, or history of use of Flomax, he/she may require the use of Maluyugin Ring and/or Vision Blue during surgery. Patient understands the risks, benefits, and alternatives to surgery.    Continue Systane Complete Artificial Tears - Use 1 Drop into both eyes three times a day.    Cataract Presurgical Documentation    Cataract: Left eye (OS)    Current Visual Acuity  Right Eye Distance CC 20/50  Left Eye Distance CC 20/50      Visual Function: Judy Mcgee states that the decline in vision from the cataract impedes her abilities as listed in the HPI, as well as other activities of daily living.    Judy Mcgee has confirmed that she is no longer able to function adequately on a day-to-day basis because of her current visual condition.    Further, it is my medical opinion that the cataract is the primary cause, or at least a significantly contributory cause of her visual dysfunction. With uncomplicated cataract surgery and lens implantation, it is my expectation that her visual function and quality of life will improve, significantly.    The risks, benefits, alternatives, personnel and complications of cataract surgery with lens implantation were discussed with Judy Mcgee in detail. she appeared to understand and asked that I proceed with plans for surgery.    PHYSICAL EXAM:    Vital Signs:  Blood pressure 136/70, pulse 65.    Respiratory:  Normal breath sounds, no wheezing.    CARD:  Normal heart sounds  1 & 2, normal sinus rhythm.    2. Primary open-angle glaucoma, left eye, moderate stage - ICD9: 365.11, 365.72, ICD10: H40.1122  - Canaloplasty with itrack advance left eye is scheduled on 12/5/24    TMAX: Right Eye: 23 mmHg, Left Eye: 21 mmHg    PACHS: Right Eye: 571, Left Eye: 570    PREVIOUS LASERS/SURGERIES:  none    CURRENT GLAUCOMA MED REGIMEN:  Current Ophthalmic Meds        timolol maleate (TIMOPTIC) 0.5 % ophthalmic solution Use 1 Drop in both eyes every morning.  latanoprost (XALATAN) 0.005 % ophthalmic solution Use 1 Drop in both eyes daily at bedtime.    EYEDROP ADHERENCE: good    BACKGROUND GLAUCOMA INFORMATION:  Referred by: [place]  Medical History: Essential hypertension, Migraines  Family History: Glaucoma - Mother  Trauma: No  Steroid Use: No  ASA/Anti-coagulation: No  Prior Glaucoma Meds, Intolerances, Allergies: No    MOST RECENT GLAUCOMA TESTING:  Visual Field 24-2: Right Eye: Superior nasal step, Left Eye: Superior nasal step  OCT RNFL: Right Eye: Inferior thinning, Left Eye: inferior thinning  GONIO: S45f both eyes        3. Primary open-angle glaucoma, right eye, moderate stage - ICD9: 365.11, 365.72, ICD10: H40.1112    The nature of glaucoma was discussed, with emphasis on the non-reversible damage to the optic nerve. Treatment options and the importance of regular examinations and testing were covered in detail, as well as the consequences of non-compliance. The patient was given the opportunity to ask questions.    Current Ophthalmic Meds        timolol maleate (TIMOPTIC) 0.5 % ophthalmic solution Use 1 Drop in both eyes every morning.  latanoprost (XALATAN) 0.005 % ophthalmic solution Use 1 Drop in both eyes daily at bedtime.    4. Combined forms of age-related cataract of right eye - ICD9: 366.19, ICD10: H25.811  - Plan cataract surgery right eye after left eye is completed and stable    5. Optic cupping of both eyes - ICD9: 377.14, ICD10: H47.233  - Monitor    6. History of retinal tear  - ICD9: V12.49, ICD10: Z86.69  - Status post retinopexy left eye (Dr. Anderson)  - Patient cleared by Dr. Yanez from a retina stand point    7. Essential hypertension - ICD9: 401.9, ICD10: I10  8. Hx of migraines - ICD9: V12.49, ICD10: Z86.69  - Continue to monitor with PCP    I have confirmed and edited as necessary the relevant HPI, ophthalmic history, ROS, and the neuro exam findings as obtained by others. I have seen and examined Judy Mcgee.  I have discussed the case and the management of this patient's care with the Resident/Fellow, if applicable. I also have reviewed and agree with the assessment and plan as stated above and agree with all of its relevant components.        Electronically signed by Mj Lee MD at 11/26/2024 2:09 PM EST   Plan of Treatment  - documented as of this encounter   Plan of Treatment - Upcoming Encounters  Upcoming Encounters  Date Type Department Care Team (Latest Contact Info) Description   12/06/2024 8:15 AM EST Office Visit [facility] Ophthalmology   [address] [place], [place] [id number]   [phone]  Mj Lee MD   [address] [place] [address]   [phone] (Work)   [phone] (Fax)  1 DAY PO CANAL 1ST LE     Plan of Treatment - Scheduled Procedures  Scheduled Procedures  Name Priority Associated Diagnoses Date/Time   SURGERY AT [facility] FAC[facility]   Combined forms of age-related cataract of left eye   Primary open angle glaucoma of left eye, moderate stage  12/05/2024 7:45 AM EST     Procedures  - documented in this encounter   Procedures  Procedure Name Priority Date/Time Associated Diagnosis Comments   IOL BIOMETRY W/ IOL CALC OU (BOTH EYES) Routine 11/26/2024 2:09 PM EST Combined forms of age-related cataract of left eye   Combined forms of age-related cataract of right eye  Results for this procedure are in the results section.      Imaging Results  - documented in this encounter   IOL BIOMETRY W/ IOL CALC OU (BOTH EYES) (11/26/2024 2:09 PM  EST)  Imaging Results - IOL BIOMETRY W/ IOL CALC OU (BOTH EYES) (11/26/2024 2:09 PM EST)  Anatomical Region Laterality Modality       Other     Imaging Results - IOL BIOMETRY W/ IOL CALC OU (BOTH EYES) (11/26/2024 2:09 PM EST)  Narrative   11/26/2024 2:09 PM EST   Date of Procedure  11/26/2024.    Notes  Measurements only - see Procedure Record under Scanned Documents for  signed results.    LENSTAR  Right eye:  AL 24.28 ACD 2.86 WTW 11.94  Left eye:     AL 24.29 ACD 2.90 WTW 11.54      Imaging Results - IOL BIOMETRY W/ IOL CALC OU (BOTH EYES) (11/26/2024 2:09 PM EST)  Authorizing Provider Result Type   Mj Lee MD OPHTHALMOLOGY     Associated Images       11/26/2024  IOL BIOMETRY W/ IOL CALC OU (BOTH EYES)     Visit Diagnoses  - documented in this encounter   Visit Diagnoses  Diagnosis   Combined forms of age-related cataract of left eye - Primary   Other and combined forms of senile cataract    Primary open-angle glaucoma, left eye, moderate stage    Primary open-angle glaucoma, right eye, moderate stage    Combined forms of age-related cataract of right eye   Other and combined forms of senile cataract    Optic cupping of both eyes    History of retinal tear    Essential hypertension   Unspecified essential hypertension    Hx of migraines   Personal history of other disorders of nervous system and sense organs      Eye Exam    Eye Exam - Visual Acuity (Snellen - Linear)  Visual Acuity (Snellen - Linear)    Right eye Left eye   Dist cc 20/50 20/50   Near cc J5 J5     Eye Exam  Correction: Glasses     Eye Exam - Tonometry (Applanation, 2:09 PM)  Tonometry (Applanation, 2:09 PM)    Right eye Left eye   Pressure 15 15     Eye Exam - Target Pressure  Target Pressure    Right eye Left eye   Target 12-14 12-14     Eye Exam - Pupils  Pupils    Pupils Dark Light Shape React APD   Right eye PERRL 4 3 Round +2 None   Left eye PERRL 4 3 Round +2 None     Eye Exam - Visual Fields (Counting fingers)  Visual Fields  (Counting fingers)    Right eye Left eye     Full Full     Eye Exam - Extraocular Movement  Extraocular Movement    Right eye Left eye     Full Full     Eye Exam - Neuro/Psych  Neuro/Psych  Oriented x3: Yes   Mood/Affect: Normal      LENSTAR  Right eye: AL 24.28 ACD 2.86 WTW 11.94  Left eye: AL 24.29 ACD 2.90 WTW 11.54       Eye Exam - External Exam  External Exam    Right eye Left eye   External Normal including orbits and preauricular lymph nodes Normal including orbits and preauricular lymph nodes     Eye Exam - Slit Lamp Exam  Slit Lamp Exam    Right eye Left eye   Lids/Lashes Upper lid dermatochalasis Upper lid dermatochalasis   Conjunctiva/Sclera White and quiet White and quiet   Cornea Normal epithelium, stroma, endothelium, and tear film Normal epithelium, stroma, endothelium, and tear film   Anterior Chamber Deep and quiet Deep and quiet   Iris Round and reactive Round and reactive   Lens 3+ Nuclear sclerotic cataract, 3+ Posterior subcapsular cataract 3+ Nuclear sclerotic cataract, 3+ Posterior subcapsular cataract   Anterior Vitreous Vitreous floaters Vitreous floaters     Eye Exam - Fundus Exam  Fundus Exam    Right eye Left eye   Disc Small disc, temporal crescent Small disc, temporal crescent, hemmorhage disc margin at 5 o'clock   C/D Ratio 0.45 0.45   Macula Normal Normal   Vessels Normal Normal   Periphery Normal Retinopexy scar upper temporal quadrant     Eye Exam - Wearing Rx  Wearing Rx    Sphere Cylinder Axis   Right eye -4.25 +0.75 171   Left eye -3.75 +0.25 069     Care Teams  - documented as of this encounter   Care Teams  Team Member Relationship Specialty Start Date End Date   Jose Arboleda MD   NPI: [id number]   [address] [place] [address] [phone] (Work)   [phone] (Fax)  PCP - General Family Medicine 8/24/24

## 2024-12-05 NOTE — OP NOTE
Phacoemulsification Cataract with Insertion Intraocular Lens (L), Canaloplasty Eye (L) Operative Note     Date: 2024  OR Location: [facility] OR    Name: Judy Mcgee, : 1950, Age: 74 y.o., MRN: [id number], Sex: female    Diagnosis  Pre-op Diagnosis      * Combined forms of age-related cataract of left eye [H25.812]     * Primary open angle glaucoma of left eye, moderate stage [H40.1122] Post-op Diagnosis     * Combined forms of age-related cataract of left eye [H25.812]     * Primary open angle glaucoma of left eye, moderate stage [H40.1122]     Procedures  Phacoemulsification Cataract with Insertion Intraocular Lens  06289 - ID XCAPSL CTRC RMVL INSJ IO LENS PROSTH W/O ECP    Canaloplasty Eye  [id number] - ID [id number] DILAT AQUEOUS O/F CAN WO RETENTION DEV/ST      Surgeons      * Mj Lee - Primary    Resident/Fellow/Other Assistant:  Surgeons and Role:  * No surgeons found with a matching role *    Staff:   Circulator: Paris Persaud Person: Lucio  Circulator: Valerie    Anesthesia Staff: Anesthesiologist: Jesus Das DO    Procedure Summary  Anesthesia: Monitor Anesthesia Care  ASA: II  Estimated Blood Loss: None  Intra-op Medications:   Administrations occurring from 0730 to 0810 on 24:   Medication Name Total Dose   lidocaine-epinephrine (Xylocaine W/EPI) 1 %-1:100,000 injection 6 mL   tobramycin-dexamethasone (Tobradex) ophthalmic ointment 0.5 inch   acetylcholine (Miochol-E) injection 20 mg   pilocarpine (Pilocar) 1 % ophthalmic solution 1 drop   lidocaine PF (Xylocaine-MPF) local injection 1 % 30 mg   midazolam PF (Versed) injection 1 mg/mL 1 mg   propofol (Diprivan) injection 10 mg/mL 20 mg     Anesthesia Record        Intraprocedure I/O Totals       None      Specimen: No specimens collected     Drains and/or Catheters: * None in log *    I have reviewed the images and report from the Ophthalmic Biometry 24 to determine the intraocular lens power calculation for the IOL lens implant.   I have interpreted and agree with the calculation of the IOL as listed below.    Implants:  Implants       Type Name Action Serial No.       yan iol Implanted [id number]      Findings: Combined Form Age Related Cataract Left Eye                   Primary Open Angle Glaucoma Left Eye, Moderate Stage    Indications: Judy Mcgee is an 74 y.o. female who is having surgery for Combined forms of age-related cataract of left eye [H25.812]  Primary open angle glaucoma of left eye, moderate stage [H40.1122]. Decreased vision left eye.  Blurred vision for reading and watching TV.  Vision left eye impedes ability to see well to drive. Intraocular pressure not at desired target pressure with topical medications.  Patient has inability to comply with glaucoma eye drop requirements. Patient reported that the side effects of the medications are negatively impacting the patient's quality of life. Failed medical management.      The patient was seen in the preoperative area. The risks, benefits, complications, treatment options, non-operative alternatives, expected recovery and outcomes were discussed with the patient. The possibilities of reaction to medication, pulmonary aspiration, injury to surrounding structures, bleeding, recurrent infection, the need for additional procedures, failure to diagnose a condition, and creating a complication requiring transfusion or operation were discussed with the patient. The patient concurred with the proposed plan, giving informed consent.  The site of surgery was properly noted/marked if necessary per policy. The patient has been actively warmed in preoperative area. Preoperative antibiotics are not indicated. Venous thrombosis prophylaxis are not indicated.    Procedure Details: The patient was correctly identified in the pre-op area and the operative eye was marked.  The operative eye was dilated in the pre-op area.      The patient was taken to the operating room and time out was  performed before starting the procedure.  Combined anesthesia and IV sedation and topical Tetracaine eye drops were given. A peribulbar block was given using 5ml of 1% Lidocaine with Epinephrine. The operative eye was prepped and draped in the standard sterile ophthalmic fashion in preparation for ophthalmic surgery.  A Kenya wire speculum was then placed between the eyelids and the operative microscope was placed over the operative eye.   A paracentesis incision was made approximately 30 degrees away from the planned surgical incision site with the help of the MVR blade.  1% Lidocaine MPF with Phenylephrine 1.5% PF was injected into the anterior chamber through the paracentesis incision.  A near limbal clear corneal incision was fashioned in the temporal quadrant just outside  the vascular arcade.  Viscoat was injected in the anterior chamber to firm the eye.  A bent needle cystotome and Utrata forceps were used to create a continuous curvilinear capsulorhexis.  Balanced salt solution was injected beneath the anterior capsule  to hydrodissect the nucleus free from adjacent cortex and capsule.  The nucleus of the cataractous lens was removed with phacoemulsification instrument.  The residual cortex was aspirated with the irrigation/aspiration hand piece.  The posterior capsule  was then polished with the help of soft irrigation/aspiration tip.  Provisc viscoelastic was then injected into the eye to reform the anterior chamber and to open the capsular bag.  Intraocular lens implant was taken from the sterile wrapping, inspected  under the surgical microscope and found to be in good condition.  The intraocular lens implant with the power of +17.0D was injected into the capsular bag.  The viscoelastic material was aspirated from the anterior chamber and from behind the lens optics.   The anterior chamber was inflated with the help of BSS to moderate tension.      Miochol was instilled into the anterior chamber.    The anterior chamber was inflated with Discovisc and the patients head was rotated to the right side and  the microscope was rotated to the left.  Using the gonio-prism the nasal trabecular meshwork was brought into clear view.   A small amount of viscoat was placed on the cornea. The iTrack Advance cannula was inserted into the anterior chamber and was used to perform a nasal goniotomy. Through this incision, the iTrack catheter was advanced into the Schlemm's Canal by advancing the actuator and the catheter navigated 360 degrees through Schlemm's canal. Upon withdrawal of the iTrack catheter, provisc was injected into Schlemm's canal allowing for vasodilation and focal disruptions of the trabecular platelets to allow better outflow of aqueous. Approximately 9 clicks of viscoelastic per 3 clock hours in 360 degrees was placed inside Schlemm's canal. The iTrack catheter was then removed from the eye and the patients head was rotated back to the neutral position. An I & A (irrigation & aspiration) handpiece was then used to remove the Viscoat from the anterior chamber as well as any blood products that may have collected during the viscoanalostomy.      There was bleeding into the anterior chamber which was irrigated out with the help of the I and A handpiece.      The anterior chamber was formed with BSS.  Vigamox was injected into the anterior chamber and into the capsular bag through the paracentesis incision.  The surgical incision was inspected and found to be water tight.  The wire speculum and the drapes  were removed.  Fluorometholone eye drops, Acular eye drops and Betadine 5% sterile ophthalmic solution were instilled into the conjunctival sac. Tobrex ointment was instilled into the left eye.      The eye was patched and a shield was applied. The patient tolerated the procedure well and was taken to the recovery room in stable condition.     Complications:  None; patient tolerated the procedure well.     Disposition:  Home  Condition: stable     The patient will be co-managed with her Optometrist    Attending Attestation: I performed the procedure.    Mj Lee  Phone Number: [phone]

## 2024-12-05 NOTE — DISCHARGE INSTRUCTIONS
Please see enclosed instructions from Dr. Lee regarding eye drop schedule, restrictions and use of eye shield.    Please take bag with eye drops that were given to you today as well as ALL eye drops that you are using at home with you to your appointment tomorrow at Dr. Lee's office.

## 2024-12-05 NOTE — NURSING NOTE
Pt provided with verbal and written discharge instructions. Pt verbalizes understanding and denies questions.

## 2024-12-05 NOTE — ANESTHESIA POSTPROCEDURE EVALUATION
Patient: Judy Mcgee    Procedure Summary       Date: 12/05/24 Room / Location: [facility] OR  / [facility]    Anesthesia Start: 0731 Anesthesia Stop: 0810    Procedures:       Phacoemulsification Cataract with Insertion Intraocular Lens (Left: Eye)      Canaloplasty Eye (Left: Eye) Diagnosis:       Combined forms of age-related cataract of left eye      Primary open angle glaucoma of left eye, moderate stage      (Combined forms of age-related cataract of left eye [H25.812])      (Primary open angle glaucoma of left eye, moderate stage [H40.1122])    Surgeons: Mj Lee MD Responsible Provider: Jesus Das DO    Anesthesia Type: MAC ASA Status: 2            Anesthesia Type: MAC    Vitals Value Taken Time   /62 12/05/24 0808   Temp 36.1 °C (97 °F) 12/05/24 0808   Pulse 59 12/05/24 0808   Resp 16 12/05/24 0808   SpO2 95 % 12/05/24 0808       Anesthesia Post Evaluation    Patient location during evaluation: bedside  Patient participation: complete - patient participated  Level of consciousness: awake  Pain score: 0  Pain management: adequate  Multimodal analgesia pain management approach  Airway patency: patent  Cardiovascular status: acceptable  Respiratory status: acceptable and room air  Hydration status: acceptable  Postoperative Nausea and Vomiting: none  Comments: Easily awakens.  VSS.  Denies pain or nausea.    Tolerated well.  Thankful for care provided.    No notable events documented.

## 2024-12-05 NOTE — ANESTHESIA PREPROCEDURE EVALUATION
Patient: Judy Mcgee    Procedure Information       Date/Time: 12/05/24 0730    Procedures:       Phacoemulsification Cataract with Insertion Intraocular Lens (Left: Eye)      Canaloplasty Eye (Left: Eye)    Location: [facility] OR 05 / [facility] OR    Surgeons: Mj Lee MD            Relevant Problems   Anesthesia (within normal limits)   (-) Family history of malignant hyperthermia   (-) Malignant hyperthermia   (-) PONV (postoperative nausea and vomiting)      Cardiac   (+) Essential hypertension      Pulmonary   (+) Asthma      Neuro (within normal limits)      GI (within normal limits)      /Renal (within normal limits)      Liver (within normal limits)      Endocrine (within normal limits)      Hematology (within normal limits)      Musculoskeletal (within normal limits)      HEENT   (+) Primary open angle glaucoma of left eye, moderate stage      ID (within normal limits)      Skin (within normal limits)     Problem list reviewed.  Clinical information reviewed:   Tobacco  Allergies  Meds   Med Hx  Surg Hx   Fam Hx  Soc Hx        NPO Detail:  NPO/Void Status  Date of Last Liquid: 12/05/24  Time of Last Liquid: 0530  Date of Last Solid: 12/04/24  Time of Last Solid: 1900  Last Intake Type: Clear fluids  Time of Last Void: 0545         Physical Exam    Airway  Mallampati: II  TM distance: >3 FB  Neck ROM: full     Cardiovascular   Rhythm: regular  Rate: normal     Dental   Comments: Crowns and fillings intact   Pulmonary - normal exam     Abdominal            Anesthesia Plan    History of general anesthesia?: yes  History of complications of general anesthesia?: no    ASA 2     MAC     The patient is not a current smoker.    intravenous induction   Anesthetic plan and risks discussed with patient and spouse.    MAC discussed with Patient.  Plan and process discussed for anesthesia for procedure.  Risks and benefits discussed.  Need for cooperation with the surgeon for the possible block per  surgeon and procedure. All questions answered with patient.  The patient understands plan and wishes to proceed.

## 2024-12-12 ENCOUNTER — PREP FOR PROCEDURE (OUTPATIENT)
Dept: OPERATING ROOM | Facility: HOSPITAL | Age: 74
End: 2024-12-12
Payer: MEDICARE

## 2024-12-12 DIAGNOSIS — H40.1112 PRIMARY OPEN ANGLE GLAUCOMA OF RIGHT EYE, MODERATE STAGE: ICD-10-CM

## 2024-12-12 DIAGNOSIS — H25.811 COMBINED FORMS OF AGE-RELATED CATARACT OF RIGHT EYE: Primary | ICD-10-CM

## 2024-12-12 RX ORDER — PREDNISOLONE ACETATE 10 MG/ML
1 SUSPENSION/ DROPS OPHTHALMIC ONCE
OUTPATIENT
Start: 2024-12-12 | End: 2024-12-12

## 2024-12-12 RX ORDER — KETOROLAC TROMETHAMINE 5 MG/ML
1 SOLUTION OPHTHALMIC
OUTPATIENT
Start: 2024-12-12 | End: 2024-12-12

## 2024-12-12 RX ORDER — POVIDONE-IODINE 5 %
SOLUTION, NON-ORAL OPHTHALMIC (EYE) ONCE
OUTPATIENT
Start: 2024-12-12 | End: 2024-12-12

## 2024-12-12 RX ORDER — TETRACAINE HYDROCHLORIDE 5 MG/ML
1 SOLUTION OPHTHALMIC ONCE
OUTPATIENT
Start: 2024-12-12 | End: 2024-12-12

## 2024-12-18 ENCOUNTER — ANESTHESIA EVENT (OUTPATIENT)
Dept: OPERATING ROOM | Facility: HOSPITAL | Age: 74
End: 2024-12-18
Payer: MEDICARE

## 2024-12-19 ENCOUNTER — ANESTHESIA (OUTPATIENT)
Dept: OPERATING ROOM | Facility: HOSPITAL | Age: 74
End: 2024-12-19
Payer: MEDICARE

## 2024-12-19 ENCOUNTER — HOSPITAL ENCOUNTER (OUTPATIENT)
Facility: HOSPITAL | Age: 74
Setting detail: OUTPATIENT SURGERY
Discharge: HOME | End: 2024-12-19
Attending: OPHTHALMOLOGY | Admitting: OPHTHALMOLOGY
Payer: MEDICARE

## 2024-12-19 VITALS
BODY MASS INDEX: 29.02 KG/M2 | OXYGEN SATURATION: 96 % | HEIGHT: 63 IN | TEMPERATURE: 97.3 F | RESPIRATION RATE: 16 BRPM | WEIGHT: 163.8 LBS | HEART RATE: 74 BPM | SYSTOLIC BLOOD PRESSURE: 112 MMHG | DIASTOLIC BLOOD PRESSURE: 79 MMHG

## 2024-12-19 PROCEDURE — 3700000001 HC GENERAL ANESTHESIA TIME - INITIAL BASE CHARGE: Performed by: OPHTHALMOLOGY

## 2024-12-19 PROCEDURE — 2720000007 HC OR 272 NO HCPCS: Performed by: OPHTHALMOLOGY

## 2024-12-19 PROCEDURE — 7100000009 HC PHASE TWO TIME - INITIAL BASE CHARGE: Performed by: OPHTHALMOLOGY

## 2024-12-19 PROCEDURE — 2760000001 HC OR 276 NO HCPCS: Performed by: OPHTHALMOLOGY

## 2024-12-19 PROCEDURE — 3700000002 HC GENERAL ANESTHESIA TIME - EACH INCREMENTAL 1 MINUTE: Performed by: OPHTHALMOLOGY

## 2024-12-19 PROCEDURE — 3600000008 HC OR TIME - EACH INCREMENTAL 1 MINUTE - PROCEDURE LEVEL THREE: Performed by: OPHTHALMOLOGY

## 2024-12-19 PROCEDURE — C1889 IMPLANT/INSERT DEVICE, NOC: HCPCS | Performed by: OPHTHALMOLOGY

## 2024-12-19 PROCEDURE — V2632 POST CHMBR INTRAOCULAR LENS: HCPCS | Performed by: OPHTHALMOLOGY

## 2024-12-19 PROCEDURE — 2500000004 HC RX 250 GENERAL PHARMACY W/ HCPCS (ALT 636 FOR OP/ED): Performed by: ANESTHESIOLOGY

## 2024-12-19 PROCEDURE — 2500000005 HC RX 250 GENERAL PHARMACY W/O HCPCS: Performed by: OPHTHALMOLOGY

## 2024-12-19 PROCEDURE — 7100000010 HC PHASE TWO TIME - EACH INCREMENTAL 1 MINUTE: Performed by: OPHTHALMOLOGY

## 2024-12-19 PROCEDURE — 2500000001 HC RX 250 WO HCPCS SELF ADMINISTERED DRUGS (ALT 637 FOR MEDICARE OP): Performed by: OPHTHALMOLOGY

## 2024-12-19 PROCEDURE — 3600000003 HC OR TIME - INITIAL BASE CHARGE - PROCEDURE LEVEL THREE: Performed by: OPHTHALMOLOGY

## 2024-12-19 PROCEDURE — 2500000004 HC RX 250 GENERAL PHARMACY W/ HCPCS (ALT 636 FOR OP/ED): Performed by: OPHTHALMOLOGY

## 2024-12-19 DEVICE — STERILE UV ABSORBING HYDROPHOBIC ACRYLIC FOLDABLE ASPHERIC POSTERIOR CHAMBER INTRAOCULAR LENS WITH THE AUTONOME™ AUTOMATED PRE-LOADED DELIVERY SYSTEM
Type: IMPLANTABLE DEVICE | Site: EYE | Status: FUNCTIONAL
Brand: CLAREON™

## 2024-12-19 RX ORDER — KETOROLAC TROMETHAMINE 5 MG/ML
1 SOLUTION OPHTHALMIC
Status: COMPLETED | OUTPATIENT
Start: 2024-12-19 | End: 2024-12-19

## 2024-12-19 RX ORDER — LIDOCAINE HYDROCHLORIDE AND EPINEPHRINE 10; 10 UG/ML; MG/ML
INJECTION, SOLUTION INFILTRATION; PERINEURAL AS NEEDED
Status: DISCONTINUED | OUTPATIENT
Start: 2024-12-19 | End: 2024-12-19 | Stop reason: HOSPADM

## 2024-12-19 RX ORDER — FLUOROMETHOLONE 1 MG/ML
1 SUSPENSION/ DROPS OPHTHALMIC 4 TIMES DAILY
Status: DISCONTINUED | OUTPATIENT
Start: 2024-12-19 | End: 2024-12-19 | Stop reason: HOSPADM

## 2024-12-19 RX ORDER — POVIDONE-IODINE 5 %
SOLUTION, NON-ORAL OPHTHALMIC (EYE) ONCE
Status: COMPLETED | OUTPATIENT
Start: 2024-12-19 | End: 2024-12-19

## 2024-12-19 RX ORDER — LIDOCAINE HYDROCHLORIDE 10 MG/ML
INJECTION, SOLUTION EPIDURAL; INFILTRATION; INTRACAUDAL; PERINEURAL AS NEEDED
Status: DISCONTINUED | OUTPATIENT
Start: 2024-12-19 | End: 2024-12-19

## 2024-12-19 RX ORDER — PROPOFOL 10 MG/ML
INJECTION, EMULSION INTRAVENOUS AS NEEDED
Status: DISCONTINUED | OUTPATIENT
Start: 2024-12-19 | End: 2024-12-19

## 2024-12-19 RX ORDER — OXYCODONE HYDROCHLORIDE 5 MG/1
5 TABLET ORAL ONCE
Status: DISCONTINUED | OUTPATIENT
Start: 2024-12-19 | End: 2024-12-19 | Stop reason: HOSPADM

## 2024-12-19 RX ORDER — PILOCARPINE HYDROCHLORIDE 10 MG/ML
1 SOLUTION/ DROPS OPHTHALMIC 2 TIMES DAILY
Status: DISCONTINUED | OUTPATIENT
Start: 2024-12-19 | End: 2024-12-19 | Stop reason: HOSPADM

## 2024-12-19 RX ORDER — PREDNISOLONE ACETATE 10 MG/ML
1 SUSPENSION/ DROPS OPHTHALMIC ONCE
Status: DISCONTINUED | OUTPATIENT
Start: 2024-12-19 | End: 2024-12-19

## 2024-12-19 RX ORDER — ONDANSETRON HYDROCHLORIDE 2 MG/ML
4 INJECTION, SOLUTION INTRAVENOUS EVERY 4 HOURS PRN
Status: DISCONTINUED | OUTPATIENT
Start: 2024-12-19 | End: 2024-12-19 | Stop reason: HOSPADM

## 2024-12-19 RX ORDER — MIDAZOLAM HYDROCHLORIDE 1 MG/ML
INJECTION INTRAMUSCULAR; INTRAVENOUS AS NEEDED
Status: DISCONTINUED | OUTPATIENT
Start: 2024-12-19 | End: 2024-12-19

## 2024-12-19 RX ORDER — ONDANSETRON HYDROCHLORIDE 2 MG/ML
INJECTION, SOLUTION INTRAVENOUS AS NEEDED
Status: DISCONTINUED | OUTPATIENT
Start: 2024-12-19 | End: 2024-12-19

## 2024-12-19 RX ORDER — FENTANYL CITRATE 50 UG/ML
INJECTION, SOLUTION INTRAMUSCULAR; INTRAVENOUS AS NEEDED
Status: DISCONTINUED | OUTPATIENT
Start: 2024-12-19 | End: 2024-12-19

## 2024-12-19 RX ORDER — TETRACAINE HYDROCHLORIDE 5 MG/ML
1 SOLUTION OPHTHALMIC ONCE
Status: COMPLETED | OUTPATIENT
Start: 2024-12-19 | End: 2024-12-19

## 2024-12-19 RX ADMIN — PHENYLEPHRINE HYDROCHLORIDE 1 DROP: 100 SOLUTION/ DROPS OPHTHALMIC at 06:35

## 2024-12-19 RX ADMIN — POVIDONE-IODINE: 5 SOLUTION OPHTHALMIC at 06:26

## 2024-12-19 RX ADMIN — PHENYLEPHRINE HYDROCHLORIDE 1 DROP: 100 SOLUTION/ DROPS OPHTHALMIC at 06:48

## 2024-12-19 RX ADMIN — PHENYLEPHRINE HYDROCHLORIDE 1 DROP: 100 SOLUTION/ DROPS OPHTHALMIC at 06:53

## 2024-12-19 RX ADMIN — KETOROLAC TROMETHAMINE 1 DROP: 5 SOLUTION/ DROPS OPHTHALMIC at 06:26

## 2024-12-19 RX ADMIN — PHENYLEPHRINE HYDROCHLORIDE 1 DROP: 100 SOLUTION/ DROPS OPHTHALMIC at 06:31

## 2024-12-19 RX ADMIN — KETOROLAC TROMETHAMINE 1 DROP: 5 SOLUTION/ DROPS OPHTHALMIC at 06:53

## 2024-12-19 RX ADMIN — POLYVINYL ALCOHOL, POVIDONE 1 DROP: 14; 6 SOLUTION/ DROPS OPHTHALMIC at 06:35

## 2024-12-19 RX ADMIN — KETOROLAC TROMETHAMINE 1 DROP: 5 SOLUTION/ DROPS OPHTHALMIC at 06:48

## 2024-12-19 RX ADMIN — POLYVINYL ALCOHOL, POVIDONE 1 DROP: 14; 6 SOLUTION/ DROPS OPHTHALMIC at 06:48

## 2024-12-19 RX ADMIN — TETRACAINE HYDROCHLORIDE 1 DROP: 5 SOLUTION OPHTHALMIC at 06:26

## 2024-12-19 RX ADMIN — KETOROLAC TROMETHAMINE 1 DROP: 5 SOLUTION/ DROPS OPHTHALMIC at 06:35

## 2024-12-19 RX ADMIN — POLYVINYL ALCOHOL, POVIDONE 1 DROP: 14; 6 SOLUTION/ DROPS OPHTHALMIC at 06:26

## 2024-12-19 RX ADMIN — POLYVINYL ALCOHOL, POVIDONE 1 DROP: 14; 6 SOLUTION/ DROPS OPHTHALMIC at 06:52

## 2024-12-19 SDOH — HEALTH STABILITY: MENTAL HEALTH: CURRENT SMOKER: 0

## 2024-12-19 ASSESSMENT — COLUMBIA-SUICIDE SEVERITY RATING SCALE - C-SSRS
1. IN THE PAST MONTH, HAVE YOU WISHED YOU WERE DEAD OR WISHED YOU COULD GO TO SLEEP AND NOT WAKE UP?: NO
2. HAVE YOU ACTUALLY HAD ANY THOUGHTS OF KILLING YOURSELF?: NO
6. HAVE YOU EVER DONE ANYTHING, STARTED TO DO ANYTHING, OR PREPARED TO DO ANYTHING TO END YOUR LIFE?: NO

## 2024-12-19 ASSESSMENT — PAIN - FUNCTIONAL ASSESSMENT
PAIN_FUNCTIONAL_ASSESSMENT: 0-10

## 2024-12-19 ASSESSMENT — PAIN SCALES - GENERAL
PAINLEVEL_OUTOF10: 0 - NO PAIN

## 2024-12-19 NOTE — H&P
H&P Notes  - documented in this encounter   Mj Lee MD - 12/11/2024 9:47 AM EST  Formatting of this note is different from the original.  HISTORY AND PHYSICAL EXAMINATION    SERVICE DATE: 12/11/2024  SERVICE TIME: 9:48 AM    PRIMARY CARE PHYSICIAN: Jose Arboleda MD    REASON FOR VISIT:  Judy Mcgee is a 74 year old female who is being seen for Combined form age related cataract right eye / Primary open angle glaucoma right eye    The patient has the following:  ACTIVE PROBLEM LIST  Migraines  Combined Forms of Age-Related Cataract of Right Eye  Ocular Hypertension, Bilateral  Optic Cupping of Both Eyes  History of Retinal Tear  Essential Hypertension  Primary Open-Angle Glaucoma, Right Eye, Moderate Stage  Primary Open-Angle Glaucoma, Left Eye, Moderate Stage  Status Post Cataract Extraction and Insertion of Intraocular Lens of Left Eye    SUBJECTIVE  CHIEF COMPLAINT: Combined form age related cataract right eye, Primary open angle glaucoma right eye  Associated symptoms: Blurry vision, difficulty reading small print, glare around lights at night    PAST MEDICAL HISTORY  Diagnosis Date  Essential hypertension  Migraines    PAST SURGICAL HISTORY  Procedure Laterality Date  CANALOPLASTY W/O STENT Left 12/05/2024  with iTrack advance  REMOVAL OF HEEL SPUR Right  REMV CATARACT EXTRACAP,INSERT LENS Left 12/05/2024  TOTAL KNEE REPLACEMENT Left  2019    FAMILY HISTORY  Problem Relation Age of Onset  Glaucoma Mother    SOCIAL HISTORY:  Social History    Tobacco Use  Smoking status: Never  Smokeless tobacco: Never    MEDICATIONS:  Prior to Admission medications as of 12/11/24 0941  Medication Sig Last Dose Taking  keTORolac (ACULAR) 0.5 % ophthalmic solution Use 1 Drop in the left eye four times daily. Taking Yes  fluorometholone (FML LIQUID FILM) 0.1 % ophthalmic suspension Use 1 Drop in the left eye three times a day. Taking Yes  pilocarpine (ISOPTO CARPINE) 1 % ophthalmic solution Use 1 Drop in the left  eye two times a day. Taking Yes  timolol maleate (TIMOPTIC) 0.5 % ophthalmic solution Use 1 Drop in both eyes every morning. Taking Yes  latanoprost (XALATAN) 0.005 % ophthalmic solution Use 1 Drop in both eyes daily at bedtime. Taking Yes  loratadine (CLARITIN) 10 mg tablet Take by mouth as directed. Taking Yes  bisoprolol-hydrochlorothiazide (ZIAC) 2.5-6.25 mg per tablet Take 1 tablet by mouth once daily. Taking Yes  SUMATRIPTAN SUCCINATE (IMITREX ORAL) Take by mouth. Taking Yes  diclofenac sodium (VOLTAREN) 1 % topical gel Apply 2 g to affected area three times daily as needed.    No medication comments found.    CURRENT ALLERGIES:  ALLERGIES  Allergen Reactions  Cortisone Rash    REVIEW OF SYSTEMS:  PAIN ASSESSMENT:  General: No weight loss, malaise or fevers.  Neuro: No Hx of stroke or seizures  Respiratory: No history of current cough or dyspnea, or pneumonia in the past 6 weeks. No history of respiratory/pulmonary symptoms or problems  Cardiovascular: Positive for: Hypertension  GI: No history of GI symptoms or problems. No history of esophageal varices, recent ascites, or ETOH greater than 2 drinks per day.  : No history of UTI in past 6 weeks. No history of renal failure. Not currently on or requiring dialysis. No history of  symptoms or problems.  GYN: Negative for abnormal vaginal bleeding, abnormal vaginal discharge.  Pregnancy: Denies  Endocrine: No history of diabetes. Has not taken steroids within the past 30 days. No history of endocrinological symptoms or problems.  Hematology: No history of bleeding or clotting disorder. Pt is not taking anti-coagulation or platelet medications. No history of hematological symptoms or problems.  Oncology: No history of CA metastasis, chemo within 30 days, or radiotherapy within 90 days. Has not lost 10% of body wt in 6 months. No history of oncological symptoms or problems.  Psych: No history of psychiatric symptoms or problems.  Musculoskeletal: Negative for  joint pain or swelling, back pain or muscle pain.  Skin: Negative for lesions, rash and itching.    PHYSICAL EXAM:  VITALS:  /70  Pulse 65        General: Alert and oriented  Skin: Normal color, no rash, no lesions.  HEENT: EOM, pupils equal, round and reactive.  Cardiovascular: Normal S1 & S2, no rubs, murmurs or gallops. No JVD. Pulse regular.  Lungs: Normal breath sounds, no wheezes or crackles.  Abdomen: Soft, non-tender, no rigidity.  Extremities: No deformity, no edema or tenderness, no joint swelling or clubbing.  Neurological: Normal cognition and motor skills.  Pulses: Carotid and radial pulses normal +2.    Diagnostic tests reviewed for today's visit:  No new labs or tests    ASSESSMENT  Medication and Non-Pharmacologic VTE Prophylaxis/Anticoagulants      VTE Prophylaxis: VTE prophylaxis appropriate    Impression: There is no known pertinent medical condition which may affect silvia-operative course      [unfilled]  Clinical Risk Factors for Possible Cardiac Complications:  None    Patient is scheduled for a low-risk procedure.    FUNCTIONAL STATUS: Walk indoors, such as around the house (1.75 METs)  Do light work around the house, such as dusting or washing dishes (2.70 METs)  Take care of self, that is eating, dressing, bathing, using the toilet (2.75 METs)    Functional Class (NYHA): N/A    [employer]: Not obtained    PLAN  CONSULTS:  Patient does not require consults for optimization at this time.    The Following Tests/Procedures Have Been Initiated:  None    Instructions Given to Patient:  Patient given verbal and written preop instructions and voices comprehension and compliance.    SIGNATURE: Mj Lee MD PATIENT NAME: Judy Mcgee  DATE: December 11, 2024 MRN: [id number]  TIME: 9:49 AM PAGER/CONTACT #:    Electronically signed by Mj Lee MD at 12/11/2024 10:10 AM EST   Source Comments  - [facility]   In the event this information is protected by the [facility]  Confidentiality of Alcohol and Drug Abuse Patient Records regulations: This information has been disclosed to you from records protected by Federal confidentiality rules (42 CFR Part 2). The Federal rules prohibit you from making any further disclosure of this information unless further disclosure is expressly permitted by the written consent of the person to whom it pertains or as otherwise permitted by 42 CFR Part 2. A general authorization for the release of medical or other information is NOT sufficient for this purpose. The Federal rules restrict any use of the information to criminally investigate or prosecute any alcohol or drug abuse patient.  Reason for Visit    Reason for Visit -  Reason Comments   Blurred Vision Right Eye     Difficulty Reading Right Eye     Glare Right eye     Encounter Details    Encounter Details  Date Type Department Care Team (Latest Contact Info) Description   12/11/2024 9:30 AM EST Office Visit [facility] Ophthalmology   [address] [place] [address]   [phone]  Mj Lee MD   [address] [place] [address]   [phone] (Work)   [phone] (Fax)  Combined forms of age-related cataract of right eye (Primary Dx);  Primary open-angle glaucoma, right eye, moderate stage;  Primary open-angle glaucoma, left eye, moderate stage;  Status post cataract extraction and insertion of intraocular lens of left eye;  Optic cupping of both eyes;  History of retinal tear;  Essential hypertension;  Hx of migraines     Social History  - documented as of this encounter   Social History  Tobacco Use Types Packs/Day Years Used Date   Smoking Tobacco: Never           Smokeless Tobacco: Never             Social History  Area Deprivation Index Answer Date Recorded   National Score (1-100), lower number is lower risk 91 12/06/2024   State Score (1-10), lower number is lower risk 9 12/06/2024   Data from: https://www.neighborhoodatlas.medicine.[facility].edu/. Last address used for calculation  [address] [place] [address] 12/06/2024     Social History  Sex and Gender Information Value Date Recorded   Sex Assigned at Birth Not on file     Gender Identity Not on file     Sexual Orientation Not on file       Last Filed Vital Signs  - documented in this encounter   Last Filed Vital Signs  Vital Sign Reading Time Taken Comments   Blood Pressure 136/70 12/11/2024 9:38 AM EST     Pulse 65 12/11/2024 9:38 AM EST     Temperature - -     Respiratory Rate - -     Oxygen Saturation - -     Inhaled Oxygen Concentration - -     Weight - -     Height - -     Body Mass Index - -       Patient Instructions  - documented in this encounter   Patient Instructions  Mj Lee MD - 12/11/2024 9:47 AM EST   Formatting of this note is different from the original.  Current Ophthalmic Meds        timolol maleate (TIMOPTIC) 0.5 % ophthalmic solution Use 1 Drop in both eyes every morning.  latanoprost (XALATAN) 0.005 % ophthalmic solution Use 1 Drop in both eyes daily at bedtime.  keTORolac (ACULAR) 0.5 % ophthalmic solution Use 1 Drop in the left eye two times daily.  fluorometholone (FML LIQUID FILM) 0.1 % ophthalmic suspension Use 1 Drop in the left eye two times a day.    Pilocarpine 1 drop twice a day left eye  Systane Complete Artificial Tears - Use 1 Drop into both eyes three times a day.    If you have any questions please contact our office at [phone].  After office hours or on the weekend, please call Dr. Lee on his cell phone at [phone].  Electronically signed by Mj Lee MD at 12/11/2024 9:47 AM EST     Progress Notes  - documented in this encounter   Mj Lee MD - 12/11/2024 9:41 AM EST  Formatting of this note is different from the original.  ASSESSMENT/PLAN:  1. Combined forms of age-related cataract of right eye - ICD9: 366.19, ICD10: H25.811 (primary diagnosis)    Upon eye examination, patient was found to have a visually significant cataract right eye. Discussed cataract surgery with  patient and different intraocular lens implant options with patient: basic monofocal intraocular lens implant, Toric intraocular lens implant, and presbyopia correction intraocular lens implant. In my medical opinion, based on medical history and ocular examination, cataract surgery with intraocular lens implant will correct patient's vision and improve quality of patient's daily living activities. Patient wishes to have traditional cataract surgery with basic intraocular lens right eye scheduled on 12/19/24. Patient wishes to have cataract surgery with the option stated above. Patient understands that an intraocular lens implant does not necessarily replace the need for glasses. Patient understands that it is impossible for the surgeon to inform him/her of every possible complication that may occur. The surgeon has answered all of the patient's questions. Patient understands that if he/she has a mature or dense cataract, pseudoexfoliation cataract, or history of use of Flomax, he/she may require the use of Maluyugin Ring and/or Vision Blue during surgery. Patient understands the risks, benefits, and alternatives to surgery.    Cataract Presurgical Documentation    Cataract: Right eye (OD)    Current Visual Acuity  Right Eye Distance CC 20/50  Left Eye Distance SC 20/25    Visual Function: Judy Mcgee states that the decline in vision from the cataract impedes her abilities as listed in the HPI, as well as other activities of daily living.    Judy Mcgee has confirmed that she is no longer able to function adequately on a day-to-day basis because of her current visual condition.    Further, it is my medical opinion that the cataract is the primary cause, or at least a significantly contributory cause of her visual dysfunction. With uncomplicated cataract surgery and lens implantation, it is my expectation that her visual function and quality of life will improve, significantly.    The risks, benefits,  alternatives, personnel and complications of cataract surgery with lens implantation were discussed with Judy Valentinee in detail. she appeared to understand and asked that I proceed with plans for surgery.    PHYSICAL EXAM:    Vital Signs:  Blood pressure 136/70, pulse 65.    Respiratory:  Normal breath sounds, no wheezing.    CARD:  Normal heart sounds 1 & 2, normal sinus rhythm.    2. Primary open-angle glaucoma, right eye, moderate stage - ICD9: 365.11, 365.72, ICD10: H40.1112  - Canaloplasty with iTrack Advance right eye scheduled on 12/19/24  3. Primary open-angle glaucoma, left eye, moderate stage - ICD9: 365.11, 365.72, ICD10: H40.1122  - S/P Canaloplasty left eye done on 12/5/24    The nature of glaucoma was discussed, with emphasis on the non-reversible damage to the optic nerve. Treatment options and the importance of regular examinations and testing were covered in detail, as well as the consequences of non-compliance. The patient was given the opportunity to ask questions.    TMAX: Right Eye: 23 mmHg, Left Eye: 21 mmHg    PACHS: Right Eye: 571, Left Eye: 570    PREVIOUS LASERS/SURGERIES:  - Canaloplasty with iTrace Advance left eye 12/5/24    CURRENT GLAUCOMA MED REGIMEN:  Current Ophthalmic Meds        timolol maleate (TIMOPTIC) 0.5 % ophthalmic solution Use 1 Drop in both eyes every morning.  latanoprost (XALATAN) 0.005 % ophthalmic solution Use 1 Drop in both eyes daily at bedtime.      EYEDROP ADHERENCE: good    BACKGROUND GLAUCOMA INFORMATION:  Referred by: Rancho  Medical History: Essential hypertension, Migraines  Family History: Glaucoma - Mother  Trauma: No  Steroid Use: No  ASA/Anti-coagulation: No  Prior Glaucoma Meds, Intolerances, Allergies: No    MOST RECENT GLAUCOMA TESTING:  Visual Field 24-2: Right Eye: Superior nasal step, Left Eye: Superior nasal step  OCT RNFL: Right Eye: Inferior thinning, Left Eye: inferior thinning  GONIO: S45f both eyes        4. Status post cataract extraction  and insertion of intraocular lens of left eye - ICD9: V45.61, V43.1, ICD10: Z98.42, Z96.1  - Continue medication as directed  Current Ophthalmic Meds        keTORolac (ACULAR) 0.5 % ophthalmic solution Use 1 Drop in the left eye two times daily.  fluorometholone (FML LIQUID FILM) 0.1 % ophthalmic suspension Use 1 Drop in the left eye two times a day.    Systane Complete Artificial Tears - Use 1 Drop into both eyes three times a day.    5. Optic cupping of both eyes - ICD9: 377.14, ICD10: H47.233  - Monitor    6. History of retinal tear - ICD9: V12.49, ICD10: Z86.69  - Status post retinopexy left eye (Dr. Anderson)  - Monitor    7. Essential hypertension - ICD9: 401.9, ICD10: I10  8. Hx of migraines - ICD9: V12.49, ICD10: Z86.69  - Continue to monitor with primary care physician.    I have confirmed and edited as necessary the relevant HPI, ophthalmic history, ROS, and the neuro exam findings as obtained by others. I have seen and examined Judy Mcgee.  I have discussed the case and the management of this patient's care with the Resident/Fellow, if applicable. I also have reviewed and agree with the assessment and plan as stated above and agree with all of its relevant components.        Electronically signed by Mj Lee MD at 12/11/2024 10:10 AM EST   Plan of Treatment  - documented as of this encounter   Plan of Treatment - Upcoming Encounters  Upcoming Encounters  Date Type Department Care Team (Latest Contact Info) Description   12/20/2024 8:30 AM EST Office Visit [facility] Ophthalmology   [address] [place] [address]   [phone]  Mj Lee MD   [address] [place] [address]   [phone] (Work)   [phone] (Fax)  1 day po canal     Plan of Treatment - Scheduled Procedures  Scheduled Procedures  Name Priority Associated Diagnoses Date/Time   SURGERY AT NON-[facility] [place], operated by [facility]   Primary open angle glaucoma of right eye, moderate stage   Combined forms of age-related cataract of right  eye  12/19/2024 7:25 AM EST     Procedures  - documented in this encounter   Procedures  Procedure Name Priority Date/Time Associated Diagnosis Comments   IOL BIOMETRY W/ IOL CALC OD (RIGHT EYE) Routine 12/11/2024 10:10 AM EST Combined forms of age-related cataract of right eye  Results for this procedure are in the results section.      Imaging Results  - documented in this encounter   IOL BIOMETRY W/ IOL CALC OD (RIGHT EYE) (12/11/2024 10:10 AM EST)  Imaging Results - IOL BIOMETRY W/ IOL CALC OD (RIGHT EYE) (12/11/2024 10:10 AM EST)  Anatomical Region Laterality Modality       Other     Imaging Results - IOL BIOMETRY W/ IOL CALC OD (RIGHT EYE) (12/11/2024 10:10 AM EST)  Narrative   12/11/2024 10:10 AM EST   Date of Procedure  12/11/2024.    Notes  Measurements only - see Procedure Record under Scanned Documents for  signed results.       Imaging Results - IOL BIOMETRY W/ IOL CALC OD (RIGHT EYE) (12/11/2024 10:10 AM EST)  Authorizing Provider Result Type   Mj Lee MD OPHTHALMOLOGY     Associated Images       12/11/2024  IOL BIOMETRY W/ IOL CALC OD (RIGHT EYE)     Visit Diagnoses  - documented in this encounter   Visit Diagnoses  Diagnosis   Combined forms of age-related cataract of right eye - Primary   Other and combined forms of senile cataract    Primary open-angle glaucoma, right eye, moderate stage    Primary open-angle glaucoma, left eye, moderate stage    Status post cataract extraction and insertion of intraocular lens of left eye    Optic cupping of both eyes    History of retinal tear    Essential hypertension   Unspecified essential hypertension    Hx of migraines   Personal history of other disorders of nervous system and sense organs      Eye Exam    Eye Exam - Visual Acuity (Snellen - Linear)  Visual Acuity (Snellen - Linear)    Right eye Left eye   Dist sc   20/25 +2   Dist cc 20/50     Near cc J5       Eye Exam  Correction: Glasses     Eye Exam - Tonometry (Applanation, 9:54 AM)  Tonometry  (Applanation, 9:54 AM)    Right eye Left eye   Pressure 14 14     Eye Exam - Target Pressure  Target Pressure    Right eye Left eye   Target 12-14 12-14     Eye Exam - Pupils  Pupils    Dark Shape React APD   Right eye 4 Round +2 None   Left eye 3 Round +2 None     Eye Exam - Visual Fields (Counting fingers)  Visual Fields (Counting fingers)    Right eye Left eye     Full Full     Eye Exam - Extraocular Movement  Extraocular Movement    Right eye Left eye     Full, Ortho Full, Ortho     Eye Exam - Neuro/Psych  Neuro/Psych  Oriented x3: Yes   Mood/Affect: Normal      LENSTAR  Right eye: AL 24.29 ACD 2.88 WTW 11.94         Eye Exam - External Exam  External Exam    Right eye Left eye   External Normal including orbits and preauricular lymph nodes Normal including orbits and preauricular lymph nodes     Eye Exam - Slit Lamp Exam  Slit Lamp Exam    Right eye Left eye   Lids/Lashes Upper lid dermatochalasis Normal lids, lashes, lacrimal glands, and lacrimal drainage   Conjunctiva/Sclera White and quiet White and quiet   Cornea Normal epithelium, stroma, endothelium, and tear film Normal epithelium, stroma, endothelium, and tear film   Anterior Chamber Deep and quiet Deep and quiet   Iris Round and reactive Round and reactive   Lens 3+ Nuclear sclerotic cataract, 3+ Posterior subcapsular cataract Posterior chamber intraocular lens   Anterior Vitreous Vitreous floaters Vitreous floaters     Eye Exam - Fundus Exam  Fundus Exam    Right eye Left eye   Disc Small disc, temporal crescent Small disc, temporal crescent, hemmorhage disc margin at 5 o'clock   C/D Ratio 0.45 0.45   Macula Normal Normal   Vessels Normal Normal   Periphery Normal Retinopexy scar upper temporal quadrant     Eye Exam - Wearing Rx  Wearing Rx    Sphere Cylinder Axis   Right eye -4.25 +0.75 171   Left eye           Care Teams  - documented as of this encounter   Care Teams  Team Member Relationship Specialty Start Date End Date   Jose Arboleda MD    NPI: [id number]   [address] [place] [address] [phone] (Work)   [phone] (Fax)  PCP - General Family Medicine 8/24/24

## 2024-12-19 NOTE — ANESTHESIA POSTPROCEDURE EVALUATION
Patient: Judy Mcgee    Procedure Summary       Date: 12/19/24 Room / Location: [facility] OR  / [facility] OR    Anesthesia Start: 0729 Anesthesia Stop: 0810    Procedures:       Phacoemulsification Cataract with Insertion Intraocular Lens (Right: Eye)      Canaloplasty Eye (Right: Eye) Diagnosis:       Combined forms of age-related cataract of right eye      Primary open angle glaucoma of right eye, moderate stage      (Combined forms of age-related cataract of right eye [H25.811])      (Primary open angle glaucoma of right eye, moderate stage [H40.1112])    Surgeons: Mj Lee MD Responsible Provider: Gary George MD PhD    Anesthesia Type: MAC ASA Status: 2            Anesthesia Type: MAC    Vitals Value Taken Time   /79 12/19/24 0825   Temp 36.3 °C (97.3 °F) 12/19/24 0814   Pulse 74 12/19/24 0825   Resp 16 12/19/24 0825   SpO2 96 % 12/19/24 0825       Anesthesia Post Evaluation    Patient location during evaluation: PACU  Patient participation: complete - patient participated  Level of consciousness: awake and alert  Pain management: satisfactory to patient  Airway patency: patent  Cardiovascular status: hemodynamically stable  Respiratory status: spontaneous ventilation and unassisted  Hydration status: euvolemic  Postoperative Nausea and Vomiting: none        No notable events documented.

## 2024-12-19 NOTE — ANESTHESIA PREPROCEDURE EVALUATION
Patient: Judy Mcgee    Procedure Information       Date/Time: 12/05/24 0730    Procedures:       Phacoemulsification Cataract with Insertion Intraocular Lens (Left: Eye)      Canaloplasty Eye (Left: Eye)    Location: [facility] OR  / [facility] OR    Surgeons: Mj Lee MD            Relevant Problems   Anesthesia (within normal limits)   (-) Family history of malignant hyperthermia   (-) Malignant hyperthermia   (-) PONV (postoperative nausea and vomiting)      Cardiac   (+) Essential hypertension      Pulmonary   (+) Asthma      Neuro (within normal limits)      GI (within normal limits)      /Renal (within normal limits)      Liver (within normal limits)      Endocrine (within normal limits)      Hematology (within normal limits)      Musculoskeletal (within normal limits)      HEENT   (+) Primary open angle glaucoma of left eye, moderate stage   (+) Primary open angle glaucoma of right eye, moderate stage      ID (within normal limits)      Skin (within normal limits)     Problem list reviewed.  Clinical information reviewed:   Tobacco  Allergies  Meds  Problems  Med Hx  Surg Hx  OB Status    Fam Hx  Soc Hx        NPO Detail:  NPO/Void Status  Carbohydrate Drink Given Prior to Surgery? : N  Date of Last Liquid: 12/19/24  Time of Last Liquid: 0530  Date of Last Solid: 12/18/24  Time of Last Solid: 1900  Last Intake Type: Clear fluids  Time of Last Void: 0650         Physical Exam    Airway  Mallampati: II  TM distance: >3 FB  Neck ROM: full     Cardiovascular   Rhythm: regular  Rate: normal     Dental   Comments: Crowns and fillings intact   Pulmonary - normal exam     Abdominal            Anesthesia Plan    History of general anesthesia?: yes  History of complications of general anesthesia?: no    ASA 2     MAC     The patient is not a current smoker.    intravenous induction   Anesthetic plan and risks discussed with patient and spouse.    MAC discussed with Patient.  Plan and process discussed  for anesthesia for procedure.  Risks and benefits discussed.  Need for cooperation with the surgeon for the possible block per surgeon and procedure. All questions answered with patient.  The patient understands plan and wishes to proceed.

## 2024-12-19 NOTE — OP NOTE
Phacoemulsification Cataract with Insertion Intraocular Lens (R), Canaloplasty Eye (R) Operative Note     Date: 2024  OR Location: [facility] OR    Name: Judy Mcgee, : 1950, Age: 74 y.o., MRN: [id number], Sex: female    Diagnosis  Pre-op Diagnosis      * Combined forms of age-related cataract of right eye [H25.811]     * Primary open angle glaucoma of right eye, moderate stage [H40.1112] Post-op Diagnosis     * Combined forms of age-related cataract of right eye [H25.811]     * Primary open angle glaucoma of right eye, moderate stage [H40.1112]     Procedures  Phacoemulsification Cataract with Insertion Intraocular Lens  71770 - MA XCAPSL CTRC RMVL INSJ IO LENS PROSTH W/O ECP    Canaloplasty Eye  90622 - MA TRLUML DILAT AQUEOUS O/F CAN WO RETENTION DEV/ST      Surgeons      * Mj Lee - Primary    Resident/Fellow/Other Assistant:  Surgeons and Role:  * No surgeons found with a matching role *    Staff:   Herbulator: Valerie Persaud Person: Jeffrey  Circulator: Jeronimo    Anesthesia Staff: Anesthesiologist: Gary eGorge MD PhD    Procedure Summary  Anesthesia: Monitor Anesthesia Care  ASA: ASA status not filed in the log.  Estimated Blood Loss: None  Intra-op Medications:   Administrations occurring from 0730 to 0810 on 24:   Medication Name Total Dose   lidocaine-epinephrine (Xylocaine W/EPI) 1 %-1:100,000 injection 5 mL   tobramycin (Tobrex) 0.3 % ophthalmic ointment 0.5 inch   fentaNYL (Sublimaze) injection 50 mcg/mL 50 mcg   lidocaine PF (Xylocaine-MPF) local injection 1 % 40 mg   midazolam PF (Versed) injection 1 mg/mL 2 mg   ondansetron 2 mg/mL 4 mg   propofol (Diprivan) injection 10 mg/mL 40 mg     Anesthesia Record        Intraprocedure I/O Totals       None      Specimen: No specimens collected     Drains and/or Catheters: * None in log *    I have reviewed the images and report from the Ophthalmic Biometry 24 to determine the intraocular lens power calculation for the IOL lens  implant.  I have interpreted and agree with the calculation of the IOL as listed below.    Implants:  Implants       Type Name Action Serial No.      Lens JENNIFERON IOL 17.5 Implanted [id number]      Findings: Combined Form Age Related Cataract Right Eye, Primary Open Angle Glaucoma Right Eye, Moderate Stage    Indications: Judy Mcgee is an 74 y.o. female who is having surgery for Combined forms of age-related cataract of right eye [H25.811]  Primary open angle glaucoma of right eye, moderate stage [H40.1112]. Decreased vision right eye, difficulty seeing for near and distance.  Difficulty seeing to read and watch TV with right eye.  Vision right eye impedes patients ability to see well when driving.  Intraocular pressure not at desired target pressure with topical medications.  Patient has inability to comply with glaucoma eye drop requirements. Patient reported that the side effects of the medications are negatively impacting the patient's quality of life. Failed medical management.      The patient was seen in the preoperative area. The risks, benefits, complications, treatment options, non-operative alternatives, expected recovery and outcomes were discussed with the patient. The possibilities of reaction to medication, pulmonary aspiration, injury to surrounding structures, bleeding, recurrent infection, the need for additional procedures, failure to diagnose a condition, and creating a complication requiring transfusion or operation were discussed with the patient. The patient concurred with the proposed plan, giving informed consent.  The site of surgery was properly noted/marked if necessary per policy. The patient has been actively warmed in preoperative area. Preoperative antibiotics are not indicated. Venous thrombosis prophylaxis are not indicated.    Procedure Details: The patient was correctly identified in the pre-op area and the operative eye was marked.  The operative eye was dilated in the pre-op  area.      The patient was taken to the operating room and time out was performed before starting the procedure.  Combined anesthesia and IV sedation and topical Tetracaine eye drops were given. A peribulbar block was given using 5ml of 1% Lidocaine with Epinephrine. The operative eye was prepped and draped in the standard sterile ophthalmic fashion in preparation for ophthalmic surgery.  A Kenya wire speculum was then placed between the eyelids and the operative microscope was placed over the operative eye.   A paracentesis incision was made approximately 30 degrees away from the planned surgical incision site with the help of the MVR blade.  1% Lidocaine MPF with Phenylephrine 1.5% PF was injected into the anterior chamber through the paracentesis incision.  A near limbal clear corneal incision was fashioned in the temporal quadrant just outside  the vascular arcade.  Viscoat was injected in the anterior chamber to firm the eye.  A bent needle cystotome and Utrata forceps were used to create a continuous curvilinear capsulorhexis.  Balanced salt solution was injected beneath the anterior capsule  to hydrodissect the nucleus free from adjacent cortex and capsule.  The nucleus of the cataractous lens was removed with phacoemulsification instrument.  The residual cortex was aspirated with the irrigation/aspiration hand piece.  The posterior capsule  was then polished with the help of soft irrigation/aspiration tip.  Provisc viscoelastic was then injected into the eye to reform the anterior chamber and to open the capsular bag.  Intraocular lens implant was taken from the sterile wrapping, inspected  under the surgical microscope and found to be in good condition.  The intraocular lens implant with the power of 17.5D was injected into the capsular bag.  The viscoelastic material was aspirated from the anterior chamber and from behind the lens optics.   The anterior chamber was inflated with the help of BSS to  moderate tension.      Miochol was instilled into the anterior chamber.   The anterior chamber was inflated with Discovisc and the patients head was rotated to the right side and  the microscope was rotated to the left.  Using the gonio-prism the nasal trabecular meshwork was brought into clear view.   A small amount of viscoat was placed on the cornea. The iTrack Advance cannula was inserted into the anterior chamber and was used to perform a nasal goniotomy. Through this incision, the iTrack catheter was advanced into the Schlemm's Canal by advancing the actuator and the catheter navigated 360 degrees through Schlemm's canal. Upon withdrawal of the iTrack catheter, provisc was injected into Schlemm's canal allowing for vasodilation and focal disruptions of the trabecular platelets to allow better outflow of aqueous. Approximately 9 clicks of viscoelastic per 3 clock hours in 360 degrees was placed inside Schlemm's canal. The iTrack catheter was then removed from the eye and the patients head was rotated back to the neutral position. An I & A (irrigation & aspiration) handpiece was then used to remove the Viscoat from the anterior chamber as well as any blood products that may have collected during the viscoanalostomy.      There was bleeding into the anterior chamber which was irrigated out with the help of the I and A handpiece.      The anterior chamber was formed with BSS.  Vigamox was injected into the anterior chamber and into the capsular bag through the paracentesis incision.  The surgical incision was inspected and found to be water tight.  The wire speculum and the drapes  were removed.  Fluorometholone eye drops, Acular eye drops and Betadine 5% sterile ophthalmic solution were instilled into the conjunctival sac. Tobrex ointment was instilled into the left eye.      The eye was patched and a shield was applied. The patient tolerated the procedure well and was taken to the recovery room in stable  condition     Complications:  None; patient tolerated the procedure well.    Disposition:  Home  Condition: stable     Attending Attestation: I performed the procedure.    Patient will be co-managed with her Optometrist    Mj Lee  Phone Number: [phone]

## 2025-03-07 DIAGNOSIS — I10 PRIMARY HYPERTENSION: ICD-10-CM

## 2025-03-10 RX ORDER — BISOPROLOL FUMARATE AND HYDROCHLOROTHIAZIDE 5; 6.25 MG/1; MG/1
1 TABLET ORAL DAILY
Qty: 90 TABLET | Refills: 3 | OUTPATIENT
Start: 2025-03-10

## 2025-03-18 ENCOUNTER — APPOINTMENT (OUTPATIENT)
Dept: PRIMARY CARE | Facility: CLINIC | Age: 75
End: 2025-03-18
Payer: MEDICARE

## 2025-04-01 ENCOUNTER — APPOINTMENT (OUTPATIENT)
Dept: PRIMARY CARE | Facility: CLINIC | Age: 75
End: 2025-04-01
Payer: MEDICARE

## 2025-04-01 VITALS
HEART RATE: 61 BPM | SYSTOLIC BLOOD PRESSURE: 130 MMHG | OXYGEN SATURATION: 96 % | BODY MASS INDEX: 29.5 KG/M2 | WEIGHT: 166.5 LBS | DIASTOLIC BLOOD PRESSURE: 78 MMHG

## 2025-04-01 DIAGNOSIS — R51.9 CHRONIC NONINTRACTABLE HEADACHE, UNSPECIFIED HEADACHE TYPE: ICD-10-CM

## 2025-04-01 DIAGNOSIS — Z12.31 ENCOUNTER FOR SCREENING MAMMOGRAM FOR BREAST CANCER: ICD-10-CM

## 2025-04-01 DIAGNOSIS — Z00.00 ROUTINE GENERAL MEDICAL EXAMINATION AT HEALTH CARE FACILITY: Primary | ICD-10-CM

## 2025-04-01 DIAGNOSIS — G43.009 MIGRAINE WITHOUT AURA AND WITHOUT STATUS MIGRAINOSUS, NOT INTRACTABLE: ICD-10-CM

## 2025-04-01 DIAGNOSIS — L60.8: ICD-10-CM

## 2025-04-01 DIAGNOSIS — I10 PRIMARY HYPERTENSION: ICD-10-CM

## 2025-04-01 DIAGNOSIS — G89.29 CHRONIC NONINTRACTABLE HEADACHE, UNSPECIFIED HEADACHE TYPE: ICD-10-CM

## 2025-04-01 PROCEDURE — 1036F TOBACCO NON-USER: CPT | Performed by: FAMILY MEDICINE

## 2025-04-01 PROCEDURE — G0439 PPPS, SUBSEQ VISIT: HCPCS | Performed by: FAMILY MEDICINE

## 2025-04-01 PROCEDURE — 3078F DIAST BP <80 MM HG: CPT | Performed by: FAMILY MEDICINE

## 2025-04-01 PROCEDURE — 1170F FXNL STATUS ASSESSED: CPT | Performed by: FAMILY MEDICINE

## 2025-04-01 PROCEDURE — 99214 OFFICE O/P EST MOD 30 MIN: CPT | Performed by: FAMILY MEDICINE

## 2025-04-01 PROCEDURE — 1160F RVW MEDS BY RX/DR IN RCRD: CPT | Performed by: FAMILY MEDICINE

## 2025-04-01 PROCEDURE — 1159F MED LIST DOCD IN RCRD: CPT | Performed by: FAMILY MEDICINE

## 2025-04-01 PROCEDURE — 3075F SYST BP GE 130 - 139MM HG: CPT | Performed by: FAMILY MEDICINE

## 2025-04-01 RX ORDER — LATANOPROST 50 UG/ML
1 SOLUTION/ DROPS OPHTHALMIC NIGHTLY
COMMUNITY
Start: 2025-01-10

## 2025-04-01 RX ORDER — SUMATRIPTAN SUCCINATE 50 MG/1
50 TABLET ORAL ONCE AS NEEDED
Qty: 9 TABLET | Refills: 3 | Status: SHIPPED | OUTPATIENT
Start: 2025-04-01

## 2025-04-01 RX ORDER — BISOPROLOL FUMARATE AND HYDROCHLOROTHIAZIDE 5; 6.25 MG/1; MG/1
1 TABLET ORAL DAILY
Qty: 100 TABLET | Refills: 3 | Status: SHIPPED | OUTPATIENT
Start: 2025-04-01

## 2025-04-01 ASSESSMENT — ACTIVITIES OF DAILY LIVING (ADL)
TAKING_MEDICATION: INDEPENDENT
MANAGING_FINANCES: INDEPENDENT
DOING_HOUSEWORK: INDEPENDENT
BATHING: INDEPENDENT
DRESSING: INDEPENDENT
GROCERY_SHOPPING: INDEPENDENT

## 2025-04-01 ASSESSMENT — ENCOUNTER SYMPTOMS
WHEEZING: 1
DEPRESSION: 0
PALPITATIONS: 0
COUGH: 0
DIFFICULTY URINATING: 0
OCCASIONAL FEELINGS OF UNSTEADINESS: 0
LOSS OF SENSATION IN FEET: 0
HEADACHES: 1

## 2025-04-01 NOTE — ASSESSMENT & PLAN NOTE
Orders:    SUMAtriptan (Imitrex) 50 mg tablet; Take 1 tablet (50 mg) by mouth 1 time if needed for migraine (may repeat dose in 1 hour if needed, DO NOT Eceed 200 mg/day). FOR MIGRAINE RELIEF. MAY REPEAT EVERY 2 HOURS. MAX 200MG/DAY.

## 2025-04-01 NOTE — PROGRESS NOTES
Subjective   Reason for Visit: Judy Mcgee is an 75 y.o. female here for a Medicare Wellness visit.     Past Medical, Surgical, and Family History reviewed and updated in chart.    Reviewed all medications by prescribing practitioner or clinical pharmacist (such as prescriptions, OTCs, herbal therapies and supplements) and documented in the medical record.    HPI  Colon cancer screening: May 3, 2024     Neg family history for colon cancer   Last Mammogram: 3/18/2024     DEXA Scan: March 18, 2024  Prevnar Vaccine: 3/17  Pneumovax Vaccine: 9/18  Shingrix Vaccine: Completed 7/19        HTN well controlled on meds        Migraine with out aura    1 sumatriptan per month         High LDL     H/o used albuterol 1 year ago fall is worse   Right great toainail black x 3 months   Left great toenail     Has subungal hemorrhage bilatearl great toenails after wearing she's that were too tight 3 months ago     No other signs of bleeding     Normal cap rf and dp 2+         Ortho DR lorenzo  for shoulder pain did cortisone shot and xray    Right shoulder now better    Had shot last week and feels better     Patient Care Team:  Jose Arboleda MD as PCP - General (Family Medicine)  Jose Arboleda MD as PCP - [facility] Attributed Provider  Chantelle Staples MD as Surgeon (General Surgery)     Review of Systems   Respiratory:  Positive for wheezing (depends on weather cool and damp h/o asthma). Negative for cough.    Cardiovascular:  Negative for chest pain, palpitations and leg swelling.   Genitourinary:  Negative for difficulty urinating.   Neurological:  Positive for headaches.       Objective   Vitals:  /78   Pulse 61   Wt 75.5 kg (166 lb 8 oz)   SpO2 96%   BMI 29.50 kg/m²       Physical Exam  Vitals reviewed.   Constitutional:       Appearance: Normal appearance.   HENT:      Head: Normocephalic and atraumatic.   Eyes:      Conjunctiva/sclera: Conjunctivae normal.   Cardiovascular:      Rate and Rhythm: Normal  rate and regular rhythm.   Pulmonary:      Effort: Pulmonary effort is normal.      Breath sounds: Normal breath sounds.   Musculoskeletal:      Cervical back: Neck supple.   Skin:     General: Skin is warm and dry.   Neurological:      General: No focal deficit present.      Mental Status: She is alert and oriented to person, place, and time.   Psychiatric:         Mood and Affect: Mood normal.         Behavior: Behavior normal.         Thought Content: Thought content normal.         Judgment: Judgment normal.         Assessment & Plan  Primary hypertension    Orders:    bisoproloL-hydrochlorothiazide (Ziac) 5-6.25 mg tablet; Take 1 tablet by mouth once daily.    Lipid Panel; Future    Comprehensive Metabolic Panel; Future    CBC; Future    Chronic nonintractable headache, unspecified headache type    Orders:    SUMAtriptan (Imitrex) 50 mg tablet; Take 1 tablet (50 mg) by mouth 1 time if needed for migraine (may repeat dose in 1 hour if needed, DO NOT Eceed 200 mg/day). FOR MIGRAINE RELIEF. MAY REPEAT EVERY 2 HOURS. MAX 200MG/DAY.    Migraine without aura and without status migrainosus, not intractable    Orders:    SUMAtriptan (Imitrex) 50 mg tablet; Take 1 tablet (50 mg) by mouth 1 time if needed for migraine (may repeat dose in 1 hour if needed, DO NOT Eceed 200 mg/day). FOR MIGRAINE RELIEF. MAY REPEAT EVERY 2 HOURS. MAX 200MG/DAY.    Routine general medical examination at health care facility    Orders:    1 Year Follow Up In Primary Care - Wellness Exam; Future    Encounter for screening mammogram for breast cancer    Orders:    BI mammo bilateral screening tomosynthesis; Future    Nontraumatic subungual hemorrhage of toe

## 2025-04-17 LAB
ALBUMIN SERPL-MCNC: 4.2 G/DL (ref 3.6–5.1)
ALP SERPL-CCNC: 50 U/L (ref 37–153)
ALT SERPL-CCNC: 23 U/L (ref 6–29)
ANION GAP SERPL CALCULATED.4IONS-SCNC: 8 MMOL/L (CALC) (ref 7–17)
AST SERPL-CCNC: 16 U/L (ref 10–35)
BILIRUB SERPL-MCNC: 0.4 MG/DL (ref 0.2–1.2)
BUN SERPL-MCNC: 22 MG/DL (ref 7–25)
CALCIUM SERPL-MCNC: 9.2 MG/DL (ref 8.6–10.4)
CHLORIDE SERPL-SCNC: 105 MMOL/L (ref 98–110)
CHOLEST SERPL-MCNC: 171 MG/DL
CHOLEST/HDLC SERPL: 2.9 (CALC)
CO2 SERPL-SCNC: 29 MMOL/L (ref 20–32)
CREAT SERPL-MCNC: 0.66 MG/DL (ref 0.6–1)
EGFRCR SERPLBLD CKD-EPI 2021: 92 ML/MIN/1.73M2
ERYTHROCYTE [DISTWIDTH] IN BLOOD BY AUTOMATED COUNT: 13 % (ref 11–15)
GLUCOSE SERPL-MCNC: 109 MG/DL (ref 65–99)
HCT VFR BLD AUTO: 41.9 % (ref 35–45)
HDLC SERPL-MCNC: 60 MG/DL
HGB BLD-MCNC: 14.1 G/DL (ref 11.7–15.5)
LDLC SERPL CALC-MCNC: 94 MG/DL (CALC)
MCH RBC QN AUTO: 31.5 PG (ref 27–33)
MCHC RBC AUTO-ENTMCNC: 33.7 G/DL (ref 32–36)
MCV RBC AUTO: 93.7 FL (ref 80–100)
NONHDLC SERPL-MCNC: 111 MG/DL (CALC)
PLATELET # BLD AUTO: 329 THOUSAND/UL (ref 140–400)
PMV BLD REES-ECKER: 10.1 FL (ref 7.5–12.5)
POTASSIUM SERPL-SCNC: 4.4 MMOL/L (ref 3.5–5.3)
PROT SERPL-MCNC: 7 G/DL (ref 6.1–8.1)
RBC # BLD AUTO: 4.47 MILLION/UL (ref 3.8–5.1)
SODIUM SERPL-SCNC: 142 MMOL/L (ref 135–146)
TRIGL SERPL-MCNC: 77 MG/DL
WBC # BLD AUTO: 9.2 THOUSAND/UL (ref 3.8–10.8)

## 2025-04-23 ENCOUNTER — APPOINTMENT (OUTPATIENT)
Dept: RADIOLOGY | Facility: CLINIC | Age: 75
End: 2025-04-23
Payer: MEDICARE

## 2025-05-14 ENCOUNTER — HOSPITAL ENCOUNTER (OUTPATIENT)
Dept: RADIOLOGY | Facility: CLINIC | Age: 75
Discharge: HOME | End: 2025-05-14
Payer: MEDICARE

## 2025-05-14 VITALS — BODY MASS INDEX: 29.49 KG/M2 | HEIGHT: 63 IN

## 2025-05-14 DIAGNOSIS — Z12.31 ENCOUNTER FOR SCREENING MAMMOGRAM FOR BREAST CANCER: ICD-10-CM

## 2025-05-14 PROCEDURE — 77067 SCR MAMMO BI INCL CAD: CPT

## 2025-05-14 PROCEDURE — 77067 SCR MAMMO BI INCL CAD: CPT | Performed by: RADIOLOGY

## 2025-05-14 PROCEDURE — 77063 BREAST TOMOSYNTHESIS BI: CPT | Performed by: RADIOLOGY

## 2025-06-03 ENCOUNTER — OFFICE VISIT (OUTPATIENT)
Dept: PRIMARY CARE | Facility: CLINIC | Age: 75
End: 2025-06-03
Payer: MEDICARE

## 2025-06-03 VITALS
BODY MASS INDEX: 29.04 KG/M2 | WEIGHT: 163.9 LBS | TEMPERATURE: 98.7 F | DIASTOLIC BLOOD PRESSURE: 72 MMHG | HEIGHT: 63 IN | HEART RATE: 74 BPM | SYSTOLIC BLOOD PRESSURE: 127 MMHG

## 2025-06-03 DIAGNOSIS — J01.10 ACUTE NON-RECURRENT FRONTAL SINUSITIS: ICD-10-CM

## 2025-06-03 DIAGNOSIS — H10.9 BACTERIAL CONJUNCTIVITIS: Primary | ICD-10-CM

## 2025-06-03 PROCEDURE — 1160F RVW MEDS BY RX/DR IN RCRD: CPT

## 2025-06-03 PROCEDURE — 1159F MED LIST DOCD IN RCRD: CPT

## 2025-06-03 PROCEDURE — 3074F SYST BP LT 130 MM HG: CPT

## 2025-06-03 PROCEDURE — 3078F DIAST BP <80 MM HG: CPT

## 2025-06-03 PROCEDURE — 99214 OFFICE O/P EST MOD 30 MIN: CPT

## 2025-06-03 PROCEDURE — 3008F BODY MASS INDEX DOCD: CPT

## 2025-06-03 PROCEDURE — 1036F TOBACCO NON-USER: CPT

## 2025-06-03 RX ORDER — AMOXICILLIN 875 MG/1
875 TABLET, COATED ORAL 2 TIMES DAILY
Qty: 20 TABLET | Refills: 0 | Status: SHIPPED | OUTPATIENT
Start: 2025-06-03 | End: 2025-06-13

## 2025-06-03 RX ORDER — POLYMYXIN B SULFATE AND TRIMETHOPRIM 1; 10000 MG/ML; [USP'U]/ML
1 SOLUTION OPHTHALMIC
Qty: 10 ML | Refills: 0 | Status: SHIPPED | OUTPATIENT
Start: 2025-06-03 | End: 2025-06-10

## 2025-06-03 ASSESSMENT — ENCOUNTER SYMPTOMS
FEVER: 0
CHILLS: 1
SINUS PRESSURE: 1
FATIGUE: 1
SORE THROAT: 0
WHEEZING: 0
SHORTNESS OF BREATH: 0
SINUS PAIN: 1
SINUS COMPLAINT: 1
HEADACHES: 1
COUGH: 1

## 2025-06-03 NOTE — PROGRESS NOTES
"Subjective   Patient ID: Judy Mcgee is a 75 y.o. female who presents for Sinus Problem and Conjunctivitis.    Sinus Problem  Associated symptoms include chills, congestion, coughing, headaches and sinus pressure. Pertinent negatives include no shortness of breath or sore throat.   Conjunctivitis   Associated symptoms include congestion, headaches and cough. Pertinent negatives include no fever, no sore throat and no wheezing.      Here today for concerns sinus pain/pressure, congestion, productive cough and discharge from her eyes.  Symptoms started 5/25 after traveling   Has tried OTC sudafed without improvement   Review of Systems   Constitutional:  Positive for chills and fatigue. Negative for fever.   HENT:  Positive for congestion, sinus pressure and sinus pain. Negative for sore throat.    Respiratory:  Positive for cough. Negative for shortness of breath and wheezing.    Cardiovascular:  Negative for chest pain.   Neurological:  Positive for headaches.       Objective   /72 (Patient Position: Sitting)   Pulse 74   Temp 37.1 °C (98.7 °F)   Ht 1.6 m (5' 3\")   Wt 74.3 kg (163 lb 14.4 oz)   BMI 29.03 kg/m²     Physical Exam  Cardiovascular:      Rate and Rhythm: Normal rate.      Heart sounds: Normal heart sounds.   Pulmonary:      Effort: Pulmonary effort is normal.      Breath sounds: No wheezing or rales.   Neurological:      Mental Status: She is alert.         Assessment/Plan   Problem List Items Addressed This Visit    None  Visit Diagnoses         Codes      Bacterial conjunctivitis    -  Primary H10.9    Relevant Medications    polymyxin B sulf-trimethoprim (Polytrim) ophthalmic solution      Acute non-recurrent frontal sinusitis     J01.10    Relevant Medications    amoxicillin (Amoxil) 875 mg tablet             Conjunctivitis   -Poly-trim eye drops x 7 days     Sinusitis   -Amoxicillin 875 x 7 days   -Continue ibuprofen and tylenol PRN for fever or chills   "

## 2026-04-01 ENCOUNTER — APPOINTMENT (OUTPATIENT)
Dept: PRIMARY CARE | Facility: CLINIC | Age: 76
End: 2026-04-01
Payer: MEDICARE

## (undated) DEVICE — NEEDLE, RETROBULBAR 25GA X 1 1/4"

## (undated) DEVICE — TOWEL PACK, STERILE, 4/PACK, BLUE

## (undated) DEVICE — IPRISM S, SINGLE USE GONIOSCOPIC - LEFT

## (undated) DEVICE — Device

## (undated) DEVICE — PAD, EYE, OVAL, 1 5/8 X 2 5/8 IN, STERILE

## (undated) DEVICE — EYE SHIELD LENSES IN DISPENSER BOX